# Patient Record
Sex: FEMALE | Race: WHITE | NOT HISPANIC OR LATINO | Employment: UNEMPLOYED | URBAN - METROPOLITAN AREA
[De-identification: names, ages, dates, MRNs, and addresses within clinical notes are randomized per-mention and may not be internally consistent; named-entity substitution may affect disease eponyms.]

---

## 2023-02-01 PROBLEM — D36.9 ADENOMATOUS POLYP: Status: ACTIVE | Noted: 2023-02-01

## 2023-02-01 PROBLEM — R39.9 UTI SYMPTOMS: Status: ACTIVE | Noted: 2023-02-01

## 2023-02-01 PROBLEM — R30.0 DYSURIA: Status: ACTIVE | Noted: 2023-02-01

## 2023-02-01 PROBLEM — M77.12 LEFT LATERAL EPICONDYLITIS: Status: ACTIVE | Noted: 2023-02-01

## 2023-02-01 PROBLEM — N39.0 ACUTE UTI: Status: ACTIVE | Noted: 2023-02-01

## 2023-02-01 PROBLEM — E66.812 CLASS 2 SEVERE OBESITY WITH SERIOUS COMORBIDITY AND BODY MASS INDEX (BMI) OF 39.0 TO 39.9 IN ADULT: Status: ACTIVE | Noted: 2023-02-01

## 2023-02-01 PROBLEM — G43.909 HEADACHE, MIGRAINE: Status: ACTIVE | Noted: 2023-02-01

## 2023-02-01 PROBLEM — E11.9 DIABETES MELLITUS TYPE 2, UNCOMPLICATED (MULTI): Status: ACTIVE | Noted: 2023-02-01

## 2023-02-01 PROBLEM — F41.9 ANXIETY: Status: ACTIVE | Noted: 2023-02-01

## 2023-02-01 PROBLEM — I10 BENIGN ESSENTIAL HYPERTENSION: Status: ACTIVE | Noted: 2023-02-01

## 2023-02-01 PROBLEM — R79.89 ELEVATED LIVER FUNCTION TESTS: Status: ACTIVE | Noted: 2023-02-01

## 2023-02-01 PROBLEM — E66.01 CLASS 2 SEVERE OBESITY WITH SERIOUS COMORBIDITY AND BODY MASS INDEX (BMI) OF 39.0 TO 39.9 IN ADULT (MULTI): Status: ACTIVE | Noted: 2023-02-01

## 2023-02-01 PROBLEM — K21.9 GERD (GASTROESOPHAGEAL REFLUX DISEASE): Status: ACTIVE | Noted: 2023-02-01

## 2023-02-01 PROBLEM — K51.90 ULCERATIVE COLITIS (MULTI): Status: ACTIVE | Noted: 2023-02-01

## 2023-02-01 PROBLEM — M25.50 ARTHRALGIA: Status: ACTIVE | Noted: 2023-02-01

## 2023-02-01 PROBLEM — R07.9 CHEST PAIN: Status: ACTIVE | Noted: 2023-02-01

## 2023-02-01 PROBLEM — E78.5 HYPERLIPIDEMIA: Status: ACTIVE | Noted: 2023-02-01

## 2023-02-01 RX ORDER — BUPROPION HYDROCHLORIDE 150 MG/1
1 TABLET ORAL DAILY
COMMUNITY
Start: 2016-09-28 | End: 2023-03-15 | Stop reason: SDUPTHER

## 2023-02-01 RX ORDER — GABAPENTIN 600 MG/1
600 TABLET, FILM COATED ORAL DAILY
COMMUNITY

## 2023-02-01 RX ORDER — FLUOXETINE 10 MG/1
10 CAPSULE ORAL DAILY
COMMUNITY
End: 2023-03-15 | Stop reason: SDUPTHER

## 2023-02-01 RX ORDER — OMEPRAZOLE 40 MG/1
40 CAPSULE, DELAYED RELEASE ORAL DAILY
COMMUNITY
End: 2024-01-17 | Stop reason: SDUPTHER

## 2023-02-01 RX ORDER — GABAPENTIN 300 MG/1
300 TABLET, FILM COATED ORAL DAILY
COMMUNITY

## 2023-02-01 RX ORDER — ESTRADIOL 0.5 MG/1
1 TABLET ORAL DAILY
COMMUNITY
Start: 2022-09-30 | End: 2023-11-28

## 2023-02-01 RX ORDER — TRIAMTERENE AND HYDROCHLOROTHIAZIDE 37.5; 25 MG/1; MG/1
1 CAPSULE ORAL DAILY
COMMUNITY
Start: 2013-03-08 | End: 2023-03-15 | Stop reason: SDUPTHER

## 2023-02-01 RX ORDER — MESALAMINE 800 MG/1
2 TABLET, DELAYED RELEASE ORAL DAILY
COMMUNITY
Start: 2013-03-11 | End: 2024-01-18 | Stop reason: SDUPTHER

## 2023-02-01 RX ORDER — RIZATRIPTAN BENZOATE 10 MG/1
TABLET ORAL
COMMUNITY
Start: 2021-03-03

## 2023-02-01 RX ORDER — EZETIMIBE 10 MG/1
1 TABLET ORAL NIGHTLY
COMMUNITY
Start: 2015-11-03 | End: 2023-03-15 | Stop reason: SDUPTHER

## 2023-02-01 RX ORDER — SUMATRIPTAN SUCCINATE 100 MG/1
TABLET ORAL
COMMUNITY
Start: 2013-12-31 | End: 2023-10-09 | Stop reason: ALTCHOICE

## 2023-02-01 RX ORDER — ATORVASTATIN CALCIUM 40 MG/1
1 TABLET, FILM COATED ORAL NIGHTLY
COMMUNITY
Start: 2013-07-19 | End: 2023-03-15 | Stop reason: SDUPTHER

## 2023-02-01 RX ORDER — HYDROXYZINE HYDROCHLORIDE 10 MG/1
1-2 TABLET, FILM COATED ORAL 2 TIMES DAILY PRN
COMMUNITY
Start: 2022-03-04 | End: 2024-01-17 | Stop reason: SDUPTHER

## 2023-03-15 ENCOUNTER — OFFICE VISIT (OUTPATIENT)
Dept: PRIMARY CARE | Facility: CLINIC | Age: 48
End: 2023-03-15
Payer: COMMERCIAL

## 2023-03-15 VITALS
TEMPERATURE: 98.2 F | HEART RATE: 82 BPM | BODY MASS INDEX: 38.61 KG/M2 | DIASTOLIC BLOOD PRESSURE: 90 MMHG | OXYGEN SATURATION: 97 % | WEIGHT: 209.8 LBS | SYSTOLIC BLOOD PRESSURE: 138 MMHG | HEIGHT: 62 IN | RESPIRATION RATE: 16 BRPM

## 2023-03-15 DIAGNOSIS — R10.9 FLANK PAIN: Primary | ICD-10-CM

## 2023-03-15 DIAGNOSIS — M85.80 OSTEOPENIA, UNSPECIFIED LOCATION: ICD-10-CM

## 2023-03-15 DIAGNOSIS — Z92.29 HISTORY OF HORMONE REPLACEMENT THERAPY: ICD-10-CM

## 2023-03-15 DIAGNOSIS — Z15.09 BRCA GENE MUTATION POSITIVE: ICD-10-CM

## 2023-03-15 DIAGNOSIS — Z15.01 BRCA GENE MUTATION POSITIVE: ICD-10-CM

## 2023-03-15 DIAGNOSIS — E78.49 OTHER HYPERLIPIDEMIA: ICD-10-CM

## 2023-03-15 DIAGNOSIS — Z90.710 S/P TAH (TOTAL ABDOMINAL HYSTERECTOMY): ICD-10-CM

## 2023-03-15 DIAGNOSIS — Z90.13 H/O BILATERAL MASTECTOMY: ICD-10-CM

## 2023-03-15 DIAGNOSIS — K51.80 OTHER ULCERATIVE COLITIS WITHOUT COMPLICATION (MULTI): ICD-10-CM

## 2023-03-15 DIAGNOSIS — R31.9 HEMATURIA OF UNKNOWN CAUSE: ICD-10-CM

## 2023-03-15 DIAGNOSIS — F41.9 ANXIETY: ICD-10-CM

## 2023-03-15 DIAGNOSIS — E78.2 MIXED HYPERLIPIDEMIA: ICD-10-CM

## 2023-03-15 DIAGNOSIS — I10 HYPERTENSION, UNSPECIFIED TYPE: ICD-10-CM

## 2023-03-15 DIAGNOSIS — E11.9 TYPE 2 DIABETES MELLITUS WITHOUT COMPLICATION, WITHOUT LONG-TERM CURRENT USE OF INSULIN (MULTI): ICD-10-CM

## 2023-03-15 PROCEDURE — 1036F TOBACCO NON-USER: CPT | Performed by: FAMILY MEDICINE

## 2023-03-15 PROCEDURE — 3080F DIAST BP >= 90 MM HG: CPT | Performed by: FAMILY MEDICINE

## 2023-03-15 PROCEDURE — 99214 OFFICE O/P EST MOD 30 MIN: CPT | Performed by: FAMILY MEDICINE

## 2023-03-15 PROCEDURE — 3075F SYST BP GE 130 - 139MM HG: CPT | Performed by: FAMILY MEDICINE

## 2023-03-15 PROCEDURE — 3051F HG A1C>EQUAL 7.0%<8.0%: CPT | Performed by: FAMILY MEDICINE

## 2023-03-15 RX ORDER — FLUOXETINE 10 MG/1
10 CAPSULE ORAL DAILY
Qty: 90 CAPSULE | Refills: 3 | Status: SHIPPED | OUTPATIENT
Start: 2023-03-15 | End: 2024-01-17 | Stop reason: SDUPTHER

## 2023-03-15 RX ORDER — BUPROPION HYDROCHLORIDE 150 MG/1
150 TABLET ORAL DAILY
Qty: 90 TABLET | Refills: 3 | Status: SHIPPED | OUTPATIENT
Start: 2023-03-15 | End: 2023-08-02 | Stop reason: SDUPTHER

## 2023-03-15 RX ORDER — ASCORBIC ACID 500 MG
TABLET ORAL 2 TIMES DAILY
COMMUNITY
Start: 2020-02-05

## 2023-03-15 RX ORDER — RIMEGEPANT SULFATE 75 MG/75MG
75 TABLET, ORALLY DISINTEGRATING ORAL DAILY PRN
COMMUNITY
Start: 2023-02-25 | End: 2023-05-17 | Stop reason: SDUPTHER

## 2023-03-15 RX ORDER — POLYETHYLENE GLYCOL 3350, SODIUM CHLORIDE, SODIUM BICARBONATE, POTASSIUM CHLORIDE 420; 11.2; 5.72; 1.48 G/4L; G/4L; G/4L; G/4L
4000 POWDER, FOR SOLUTION ORAL ONCE
COMMUNITY
Start: 2023-02-09 | End: 2023-03-15 | Stop reason: ALTCHOICE

## 2023-03-15 RX ORDER — ACETAMINOPHEN 325 MG/1
325 TABLET ORAL AS NEEDED
COMMUNITY
Start: 2020-02-05

## 2023-03-15 RX ORDER — NARATRIPTAN 2.5 MG/1
2.5 TABLET ORAL ONCE AS NEEDED
COMMUNITY
Start: 2023-02-09

## 2023-03-15 RX ORDER — EZETIMIBE 10 MG/1
10 TABLET ORAL NIGHTLY
Qty: 90 TABLET | Refills: 3 | Status: SHIPPED | OUTPATIENT
Start: 2023-03-15 | End: 2023-04-14 | Stop reason: SDUPTHER

## 2023-03-15 RX ORDER — FLUCONAZOLE 150 MG/1
TABLET ORAL
COMMUNITY
End: 2023-04-17 | Stop reason: SDUPTHER

## 2023-03-15 RX ORDER — NITROFURANTOIN 25; 75 MG/1; MG/1
100 CAPSULE ORAL EVERY 12 HOURS SCHEDULED
COMMUNITY
Start: 2022-07-25 | End: 2024-03-12 | Stop reason: ALTCHOICE

## 2023-03-15 RX ORDER — DIAZEPAM 5 MG/1
5 TABLET ORAL DAILY PRN
COMMUNITY
Start: 2022-10-28 | End: 2023-03-15 | Stop reason: ALTCHOICE

## 2023-03-15 RX ORDER — TRIAMTERENE AND HYDROCHLOROTHIAZIDE 37.5; 25 MG/1; MG/1
1 CAPSULE ORAL DAILY
Qty: 90 CAPSULE | Refills: 3 | Status: SHIPPED | OUTPATIENT
Start: 2023-03-15 | End: 2024-01-17 | Stop reason: SDUPTHER

## 2023-03-15 RX ORDER — OMEPRAZOLE 20 MG/1
40 TABLET, DELAYED RELEASE ORAL
COMMUNITY
End: 2023-03-15 | Stop reason: ALTCHOICE

## 2023-03-15 RX ORDER — ATORVASTATIN CALCIUM 40 MG/1
40 TABLET, FILM COATED ORAL NIGHTLY
Qty: 90 TABLET | Refills: 3 | Status: SHIPPED | OUTPATIENT
Start: 2023-03-15 | End: 2023-05-30 | Stop reason: SDUPTHER

## 2023-03-15 RX ORDER — DOCUSATE SODIUM 100 MG/1
CAPSULE, LIQUID FILLED ORAL 2 TIMES DAILY
COMMUNITY
Start: 2020-02-05 | End: 2024-01-17 | Stop reason: WASHOUT

## 2023-03-15 ASSESSMENT — PATIENT HEALTH QUESTIONNAIRE - PHQ9
5. POOR APPETITE OR OVEREATING: NOT AT ALL
3. TROUBLE FALLING OR STAYING ASLEEP OR SLEEPING TOO MUCH: MORE THAN HALF THE DAYS
9. THOUGHTS THAT YOU WOULD BE BETTER OFF DEAD, OR OF HURTING YOURSELF: NOT AT ALL
4. FEELING TIRED OR HAVING LITTLE ENERGY: SEVERAL DAYS
SUM OF ALL RESPONSES TO PHQ QUESTIONS 1-9: 3
7. TROUBLE CONCENTRATING ON THINGS, SUCH AS READING THE NEWSPAPER OR WATCHING TELEVISION: NOT AT ALL
10. IF YOU CHECKED OFF ANY PROBLEMS, HOW DIFFICULT HAVE THESE PROBLEMS MADE IT FOR YOU TO DO YOUR WORK, TAKE CARE OF THINGS AT HOME, OR GET ALONG WITH OTHER PEOPLE: SOMEWHAT DIFFICULT
6. FEELING BAD ABOUT YOURSELF - OR THAT YOU ARE A FAILURE OR HAVE LET YOURSELF OR YOUR FAMILY DOWN: NOT AT ALL
1. LITTLE INTEREST OR PLEASURE IN DOING THINGS: NOT AT ALL
SUM OF ALL RESPONSES TO PHQ9 QUESTIONS 1 AND 2: 0
2. FEELING DOWN, DEPRESSED OR HOPELESS: NOT AT ALL
8. MOVING OR SPEAKING SO SLOWLY THAT OTHER PEOPLE COULD HAVE NOTICED. OR THE OPPOSITE, BEING SO FIGETY OR RESTLESS THAT YOU HAVE BEEN MOVING AROUND A LOT MORE THAN USUAL: NOT AT ALL

## 2023-03-15 ASSESSMENT — ANXIETY QUESTIONNAIRES
3. WORRYING TOO MUCH ABOUT DIFFERENT THINGS: SEVERAL DAYS
5. BEING SO RESTLESS THAT IT IS HARD TO SIT STILL: SEVERAL DAYS
7. FEELING AFRAID AS IF SOMETHING AWFUL MIGHT HAPPEN: MORE THAN HALF THE DAYS
1. FEELING NERVOUS, ANXIOUS, OR ON EDGE: SEVERAL DAYS
6. BECOMING EASILY ANNOYED OR IRRITABLE: SEVERAL DAYS
2. NOT BEING ABLE TO STOP OR CONTROL WORRYING: SEVERAL DAYS
GAD7 TOTAL SCORE: 9
4. TROUBLE RELAXING: MORE THAN HALF THE DAYS

## 2023-03-15 NOTE — PROGRESS NOTES
Subjective   Richard Contreras is a 47 y.o. female who presents for New Patient Visit (Pt here to establish care with new PCP ).  HPI  PMH:   BRACA1 prophylatic mastectomy, MOMO-HRT Dr Choudhary  Acute renal failure-hosp in [facility] d/t toradol   B/l hip replacements   DM2   [facility]-Dr Donald Linares COVID migraines-[facility]   NICOLETTE-prozac added to wellbutrin 2022   HTN   HLD  Osteopenia 11/2022 T 1.7 spine  Pap NIL neg HPV 5/2021  3 kids 1 [facility], 2 [facility]. Very involved     Episode of urinating blood and flank pain, started 1 mo ago after sex. Vaginitis swab UA and Cx neg w GYN.     Tends to yet UTI/yeast inf in the summer bc swims a lot     Upcoming c-scope next wk for UC and abd pain     Jardiance inc from 10 to 25 in Jan, hasnt started higher dose yet     Mood much better since adding prozac recently     Needs meds transitioned to me   Current Outpatient Medications on File Prior to Visit   Medication Sig Dispense Refill    acetaminophen (Tylenol) 325 mg tablet Take 1 tablet (325 mg) by mouth if needed.      ascorbic acid (Vitamin C) 500 mg tablet Take by mouth twice a day.      docusate sodium (Colace) 100 mg capsule Take by mouth twice a day.      empagliflozin (Jardiance) 25 mg Take 1 tablet (25 mg) by mouth once daily.      estradiol (Estrace) 0.5 mg tablet Take 1 tablet (0.5 mg) by mouth once daily.      fluconazole (Diflucan) 150 mg tablet take 1 tablet by mouth every 72 hours for 3 doses      gabapentin (Gralise) 300 mg tablet extended release 24 hr Take 1 tablet (300 mg) by mouth once daily.      gabapentin (Gralise) 600 mg tablet extended release 24 hr Take 600 mg by mouth 1 (one) time each day.      hydrOXYzine HCL (Atarax) 10 mg tablet Take 1-2 tablets (10-20 mg) by mouth 2 times a day as needed for anxiety.      mesalamine (Asacol) 800 mg EC tablet Take 2 tablets (1,600 mg) by mouth once daily. LAST REFILL UNTIL SEEN IN OFFICE      naratriptan (Amerge) 2.5 mg tablet Take 1 tablet (2.5 mg) by mouth 1 time if  "needed.      nitrofurantoin, macrocrystal-monohydrate, (Macrobid) 100 mg capsule Take 1 capsule (100 mg) by mouth in the morning and 1 capsule (100 mg) before bedtime.      NON FORMULARY MEDICAL MARIJUANA (NOT [facility] PRESCRIBED) DO NOT FILL      Nurtec ODT 75 mg tablet,disintegrating Take 1 tablet (75 mg) by mouth once daily as needed.      omeprazole (PriLOSEC) 40 mg DR capsule Take 1 capsule (40 mg) by mouth once daily. Do not crush or chew.      rizatriptan (Maxalt) 10 mg tablet Take by mouth. TAKE 1 TABLET BY MOUTH AS NEEDED FOR MIGRAINE HEADACHE (SEE ADMINISTRATION INSTRUCTIONS). MAY REPEAT IN 2 HOURS IF NEEDED.      SUMAtriptan (Imitrex) 100 mg tablet Take by mouth. TAKE 1 TABLET AT ONSET OF MIGRAINE, MAY REPEAT AFTER 2 HOURS IF HEADACHE RETURNS, MAXIMUM 2 PER DAY      [DISCONTINUED] atorvastatin (Lipitor) 40 mg tablet Take 1 tablet (40 mg) by mouth once daily at bedtime.      [DISCONTINUED] buPROPion XL (Wellbutrin XL) 150 mg 24 hr tablet Take 1 tablet (150 mg) by mouth once daily.      [DISCONTINUED] diazePAM (Valium) 5 mg tablet Take 1 tablet (5 mg) by mouth once daily as needed.      [DISCONTINUED] ezetimibe (Zetia) 10 mg tablet Take 1 tablet (10 mg) by mouth once daily at bedtime.      [DISCONTINUED] FLUoxetine (PROzac) 10 mg capsule Take 1 capsule (10 mg) by mouth once daily.      [DISCONTINUED] polyethylene glycol-electrolytes 420 gram solution Take 4,000 mL by mouth 1 time.      [DISCONTINUED] triamterene-hydrochlorothiazid (Dyazide) 37.5-25 mg capsule Take 1 capsule by mouth once daily.      [DISCONTINUED] omeprazole OTC (PriLOSEC OTC) 20 mg EC tablet Take 2 tablets (40 mg) by mouth once daily in the morning. Take before meals.       No current facility-administered medications on file prior to visit.       Objective   /90   Pulse 82   Temp 36.8 °C (98.2 °F)   Resp 16   Ht 1.567 m (5' 1.69\")   Wt 95.2 kg (209 lb 12.8 oz)   SpO2 97%   BMI 38.76 kg/m²    Physical Exam  General: NAD  HEENT:NCAT, " PERRLA, nml OP  Neck: no cervical CAITLYN  Heart: RRR no murmur, no edema   Lungs: CTA b/l, no wheeze or rhonchi   GI: abd soft, nontender, nondistended. No reproducible flank pain   MSK: no c/c/e. nml gait   Skin: warm and dry  Psych: cooperative, appropriate affect  Neuro: speech clear. A&Ox3  Assessment/Plan   Problem List Items Addressed This Visit          Digestive    Ulcerative colitis (CMS/[facility]): per GI upcoming c-scope next wk        Endocrine/Metabolic    Diabetes mellitus type 2, uncomplicated (CMS/HCC): agree w inc jardiance from 10 to 25 mg. Recheck a1c in 3 mo   Check microalbumin today, not on ace/arb. Consider adding if elevated     Relevant Orders    Albumin, urine, random    Hemoglobin A1C    Albumin, urine, random       Other    Anxiety: improved w adding prozac to wellbutrin. RF meds     Relevant Medications    buPROPion XL (Wellbutrin XL) 150 mg 24 hr tablet    FLUoxetine (PROzac) 10 mg capsule    Hyperlipidemia: check lipids in 3 mo, RF meds     Relevant Medications    atorvastatin (Lipitor) 40 mg tablet    ezetimibe (Zetia) 10 mg tablet    Other Relevant Orders    Comprehensive Metabolic Panel    Lipid Panel     Other Visit Diagnoses       Flank pain: in the setting of hematuria concern for kidney stone. Rpt UA w micro and culture, CT abd/pelvis.     -  Primary:     Relevant Orders    CT abdomen pelvis w IV contrast    Urine Culture    Urinalysis with Reflex Microscopic    BRCA gene mutation positive        S/P MOMO (total abdominal hysterectomy)        H/O bilateral mastectomy        Osteopenia, unspecified location: per gyn, last DEXA 11/2022         History of hormone replacement therapy per gyn         Hematuria of unknown cause  see above       Relevant Orders    CT abdomen pelvis w IV contrast    Urine Culture    Urinalysis with Reflex Microscopic    Hypertension, unspecified type: borderline, RF dyazide. Consider ace/arb if albumin elevated.         Relevant Medications     triamterene-hydrochlorothiazid (Dyazide) 37.5-25 mg capsule

## 2023-04-05 DIAGNOSIS — R10.9 FLANK PAIN: ICD-10-CM

## 2023-04-13 ENCOUNTER — LAB (OUTPATIENT)
Dept: LAB | Facility: LAB | Age: 48
End: 2023-04-13
Payer: COMMERCIAL

## 2023-04-13 DIAGNOSIS — E11.9 TYPE 2 DIABETES MELLITUS WITHOUT COMPLICATION, WITHOUT LONG-TERM CURRENT USE OF INSULIN (MULTI): ICD-10-CM

## 2023-04-13 DIAGNOSIS — R31.9 HEMATURIA OF UNKNOWN CAUSE: ICD-10-CM

## 2023-04-13 DIAGNOSIS — R10.9 FLANK PAIN: ICD-10-CM

## 2023-04-13 LAB
ALBUMIN (MG/L) IN URINE: 47.8 MG/L
ALBUMIN/CREATININE (UG/MG) IN URINE: 53.2 UG/MG CRT (ref 0–30)
APPEARANCE, URINE: ABNORMAL
BACTERIA, URINE: ABNORMAL /HPF
BILIRUBIN, URINE: NEGATIVE
BLOOD, URINE: ABNORMAL
BUDDING YEAST, URINE: PRESENT /HPF
COLOR, URINE: YELLOW
CREATININE (MG/DL) IN URINE: 89.9 MG/DL (ref 20–320)
GLUCOSE, URINE: ABNORMAL MG/DL
KETONES, URINE: NEGATIVE MG/DL
LEUKOCYTE ESTERASE, URINE: NEGATIVE
NITRITE, URINE: NEGATIVE
PH, URINE: 6 (ref 5–8)
PROTEIN, URINE: NEGATIVE MG/DL
RBC, URINE: 4 /HPF (ref 0–5)
SPECIFIC GRAVITY, URINE: 1.03 (ref 1–1.03)
SQUAMOUS EPITHELIAL CELLS, URINE: 10 /HPF
UROBILINOGEN, URINE: <2 MG/DL (ref 0–1.9)
WBC, URINE: 4 /HPF (ref 0–5)

## 2023-04-13 PROCEDURE — 82570 ASSAY OF URINE CREATININE: CPT

## 2023-04-13 PROCEDURE — 82043 UR ALBUMIN QUANTITATIVE: CPT

## 2023-04-13 PROCEDURE — 81001 URINALYSIS AUTO W/SCOPE: CPT

## 2023-04-14 ENCOUNTER — PATIENT MESSAGE (OUTPATIENT)
Dept: PRIMARY CARE | Facility: CLINIC | Age: 48
End: 2023-04-14
Payer: COMMERCIAL

## 2023-04-14 DIAGNOSIS — E78.49 OTHER HYPERLIPIDEMIA: ICD-10-CM

## 2023-04-14 RX ORDER — EZETIMIBE 10 MG/1
10 TABLET ORAL NIGHTLY
Qty: 90 TABLET | Refills: 3 | Status: SHIPPED | OUTPATIENT
Start: 2023-04-14 | End: 2023-04-17 | Stop reason: SDUPTHER

## 2023-04-17 DIAGNOSIS — B37.31 YEAST VAGINITIS: Primary | ICD-10-CM

## 2023-04-17 DIAGNOSIS — E78.49 OTHER HYPERLIPIDEMIA: ICD-10-CM

## 2023-04-17 RX ORDER — EZETIMIBE 10 MG/1
10 TABLET ORAL NIGHTLY
Qty: 90 TABLET | Refills: 3 | Status: SHIPPED | OUTPATIENT
Start: 2023-04-17 | End: 2024-01-17 | Stop reason: SDUPTHER

## 2023-04-17 RX ORDER — FLUCONAZOLE 150 MG/1
TABLET ORAL
Qty: 1 TABLET | Refills: 0 | Status: SHIPPED | OUTPATIENT
Start: 2023-04-17 | End: 2023-04-19 | Stop reason: SDUPTHER

## 2023-04-19 DIAGNOSIS — B37.31 YEAST VAGINITIS: ICD-10-CM

## 2023-04-19 RX ORDER — FLUCONAZOLE 150 MG/1
150 TABLET ORAL ONCE
Qty: 1 TABLET | Refills: 0 | Status: SHIPPED | OUTPATIENT
Start: 2023-04-19 | End: 2023-04-19

## 2023-05-17 DIAGNOSIS — G43.709 CHRONIC MIGRAINE WITHOUT AURA WITHOUT STATUS MIGRAINOSUS, NOT INTRACTABLE: ICD-10-CM

## 2023-05-17 DIAGNOSIS — E11.9 TYPE 2 DIABETES MELLITUS WITHOUT COMPLICATION, WITHOUT LONG-TERM CURRENT USE OF INSULIN (MULTI): Primary | ICD-10-CM

## 2023-05-17 NOTE — TELEPHONE ENCOUNTER
Patient sent a "[employer] message letting you know she is using more than 8 nurtec needing a refill sooner, also asked for refill of Jardiance will be out tomorrow, Advise?

## 2023-05-30 ENCOUNTER — TELEPHONE (OUTPATIENT)
Dept: PRIMARY CARE | Facility: CLINIC | Age: 48
End: 2023-05-30
Payer: COMMERCIAL

## 2023-05-30 DIAGNOSIS — E78.49 OTHER HYPERLIPIDEMIA: ICD-10-CM

## 2023-05-30 RX ORDER — ATORVASTATIN CALCIUM 40 MG/1
40 TABLET, FILM COATED ORAL NIGHTLY
Qty: 30 TABLET | Refills: 11 | Status: SHIPPED | OUTPATIENT
Start: 2023-05-30 | End: 2024-01-17 | Stop reason: SDUPTHER

## 2023-05-30 RX ORDER — ATORVASTATIN CALCIUM 40 MG/1
40 TABLET, FILM COATED ORAL
COMMUNITY
Start: 2020-07-02 | End: 2023-10-09 | Stop reason: ALTCHOICE

## 2023-08-02 ENCOUNTER — PATIENT MESSAGE (OUTPATIENT)
Dept: PRIMARY CARE | Facility: CLINIC | Age: 48
End: 2023-08-02
Payer: COMMERCIAL

## 2023-08-02 DIAGNOSIS — F41.9 ANXIETY: ICD-10-CM

## 2023-08-02 RX ORDER — BUPROPION HYDROCHLORIDE 150 MG/1
150 TABLET ORAL DAILY
Qty: 90 TABLET | Refills: 3 | Status: SHIPPED | OUTPATIENT
Start: 2023-08-02 | End: 2024-01-17 | Stop reason: SDUPTHER

## 2023-08-02 NOTE — TELEPHONE ENCOUNTER
From: Richard Contreras  To: Brittany Behm, DO  Sent: 8/2/2023 7:51 AM EDT  Subject: Refill     Can you check my refills please? I need the Wellbutrin for sure. Thanks

## 2023-08-02 NOTE — TELEPHONE ENCOUNTER
----- Message from Richard Contreras sent at 8/2/2023  7:51 AM EDT -----  Regarding: Refill   Contact: [phone]  Can you check my refills please? I need the Wellbutrin for sure. Thanks

## 2023-10-03 ENCOUNTER — TELEPHONE (OUTPATIENT)
Dept: OBSTETRICS AND GYNECOLOGY | Facility: CLINIC | Age: 48
End: 2023-10-03
Payer: COMMERCIAL

## 2023-10-03 NOTE — TELEPHONE ENCOUNTER
PT HAS SWOLLEN LYMPH NODES AN IN THE PAST HAS HAD SKIN CANCER ISSUES AND A MASTECTOMY. THE SOONEST APPT. WE COULD GET HER WAS OCT.20  SHE WOULD LIKE TO GET IN SOONER. NEED YOUR PERMISSION TO OPEN A SLOT.

## 2023-10-09 ENCOUNTER — OFFICE VISIT (OUTPATIENT)
Dept: OBSTETRICS AND GYNECOLOGY | Facility: CLINIC | Age: 48
End: 2023-10-09
Payer: COMMERCIAL

## 2023-10-09 VITALS
HEIGHT: 61 IN | BODY MASS INDEX: 38.71 KG/M2 | SYSTOLIC BLOOD PRESSURE: 120 MMHG | DIASTOLIC BLOOD PRESSURE: 74 MMHG | WEIGHT: 205 LBS

## 2023-10-09 DIAGNOSIS — M79.89 LEFT AXILLARY SWELLING: Primary | ICD-10-CM

## 2023-10-09 PROCEDURE — 3078F DIAST BP <80 MM HG: CPT | Performed by: OBSTETRICS & GYNECOLOGY

## 2023-10-09 PROCEDURE — 3074F SYST BP LT 130 MM HG: CPT | Performed by: OBSTETRICS & GYNECOLOGY

## 2023-10-09 PROCEDURE — 1036F TOBACCO NON-USER: CPT | Performed by: OBSTETRICS & GYNECOLOGY

## 2023-10-09 PROCEDURE — 3051F HG A1C>EQUAL 7.0%<8.0%: CPT | Performed by: OBSTETRICS & GYNECOLOGY

## 2023-10-09 PROCEDURE — 99213 OFFICE O/P EST LOW 20 MIN: CPT | Performed by: OBSTETRICS & GYNECOLOGY

## 2023-10-09 NOTE — PROGRESS NOTES
Subjective   Richard Contreras is a 48 y.o. female who complains of swollen lymph nodes.     HPI:  Patient is a known BRCA 1 carrier. She had a prophylactic mastectomy and oophorectomy in June 2007. Breast reconstruction after.   3-4 weeks ago there was a red spot, then after became swollen and a lump.   Last Tuesday got worse, more painful, and saw dermatology, saw red spot and lump but nothing else and told lymph nodes were inflamed.  Got better this weekend, less swollen and pinching feeling. She can still feel a small lump on the side of her left breast.  No right sided issues.  Called plastic surgeon too and has appointment on Wednesday.     Objective   Physical Exam:   Gen: no acute distress  Breasts: scars noted from prior mastectomy bilaterally  No lump palpable in either the breast or axilla bilaterally. No redness seen. Slight increased size appearance of left axilla compared to right, but no other changes appreciated on exam    Assessment/Plan   1. Left axillary swelling  Overall normal exam findings today. Possible she had an acute issues such as a small cyst or boil in the breast that caused temporary inflammation of the lymph nodes.  Given her history, I will order an ultrasound to assess the breast and axilla.  She is also still going to follow up with her plastic surgeon later this week.  - BI US breast complete left; Future

## 2023-10-10 ENCOUNTER — ANCILLARY PROCEDURE (OUTPATIENT)
Dept: RADIOLOGY | Facility: CLINIC | Age: 48
End: 2023-10-10
Payer: COMMERCIAL

## 2023-10-10 DIAGNOSIS — M79.89 LEFT AXILLARY SWELLING: ICD-10-CM

## 2023-10-10 PROCEDURE — 76642 ULTRASOUND BREAST LIMITED: CPT | Mod: LT

## 2023-10-10 PROCEDURE — 76642 ULTRASOUND BREAST LIMITED: CPT | Mod: LEFT SIDE | Performed by: STUDENT IN AN ORGANIZED HEALTH CARE EDUCATION/TRAINING PROGRAM

## 2023-10-10 PROCEDURE — 76982 USE 1ST TARGET LESION: CPT | Mod: LEFT SIDE | Performed by: STUDENT IN AN ORGANIZED HEALTH CARE EDUCATION/TRAINING PROGRAM

## 2023-10-20 ENCOUNTER — APPOINTMENT (OUTPATIENT)
Dept: OBSTETRICS AND GYNECOLOGY | Facility: CLINIC | Age: 48
End: 2023-10-20
Payer: COMMERCIAL

## 2023-11-02 ENCOUNTER — TELEPHONE (OUTPATIENT)
Dept: OBSTETRICS AND GYNECOLOGY | Facility: CLINIC | Age: 48
End: 2023-11-02
Payer: COMMERCIAL

## 2023-11-02 DIAGNOSIS — T85.43XA RUPTURE OF IMPLANT OF LEFT BREAST, INITIAL ENCOUNTER: Primary | ICD-10-CM

## 2023-11-02 NOTE — TELEPHONE ENCOUNTER
Patient called. She needs a bilateral MRI without constrast, STAT to evaluate for implant rupture.

## 2023-11-03 ENCOUNTER — HOSPITAL ENCOUNTER (OUTPATIENT)
Dept: RADIOLOGY | Facility: HOSPITAL | Age: 48
Discharge: HOME | End: 2023-11-03
Payer: COMMERCIAL

## 2023-11-03 DIAGNOSIS — T85.43XA RUPTURE OF IMPLANT OF LEFT BREAST, INITIAL ENCOUNTER: ICD-10-CM

## 2023-11-03 PROCEDURE — 77047 MRI BREAST C- BILATERAL: CPT | Mod: 50

## 2023-11-03 PROCEDURE — 77047 MRI BREAST C- BILATERAL: CPT | Mod: BILATERAL PROCEDURE | Performed by: STUDENT IN AN ORGANIZED HEALTH CARE EDUCATION/TRAINING PROGRAM

## 2023-11-27 DIAGNOSIS — E28.319 PREMATURE MENOPAUSE ON HORMONE REPLACEMENT THERAPY: Primary | ICD-10-CM

## 2023-11-27 DIAGNOSIS — Z79.890 PREMATURE MENOPAUSE ON HORMONE REPLACEMENT THERAPY: Primary | ICD-10-CM

## 2023-11-28 RX ORDER — ESTRADIOL 0.5 MG/1
0.5 TABLET ORAL DAILY
Qty: 90 TABLET | Refills: 0 | Status: SHIPPED | OUTPATIENT
Start: 2023-11-28 | End: 2024-01-17 | Stop reason: SDUPTHER

## 2023-12-29 ENCOUNTER — HOSPITAL ENCOUNTER (OUTPATIENT)
Dept: RADIOLOGY | Facility: HOSPITAL | Age: 48
Discharge: HOME | End: 2023-12-29
Payer: COMMERCIAL

## 2023-12-29 ENCOUNTER — OFFICE VISIT (OUTPATIENT)
Dept: ORTHOPEDIC SURGERY | Facility: HOSPITAL | Age: 48
End: 2023-12-29
Payer: COMMERCIAL

## 2023-12-29 VITALS — WEIGHT: 200 LBS | HEIGHT: 61 IN | BODY MASS INDEX: 37.76 KG/M2

## 2023-12-29 DIAGNOSIS — M79.671 RIGHT FOOT PAIN: ICD-10-CM

## 2023-12-29 PROCEDURE — 1036F TOBACCO NON-USER: CPT | Performed by: SPECIALIST/TECHNOLOGIST

## 2023-12-29 PROCEDURE — 3051F HG A1C>EQUAL 7.0%<8.0%: CPT | Performed by: SPECIALIST/TECHNOLOGIST

## 2023-12-29 PROCEDURE — 73630 X-RAY EXAM OF FOOT: CPT | Mod: RT

## 2023-12-29 PROCEDURE — 73630 X-RAY EXAM OF FOOT: CPT | Mod: RIGHT SIDE | Performed by: RADIOLOGY

## 2023-12-29 PROCEDURE — 99213 OFFICE O/P EST LOW 20 MIN: CPT | Performed by: SPECIALIST/TECHNOLOGIST

## 2023-12-29 ASSESSMENT — PAIN - FUNCTIONAL ASSESSMENT: PAIN_FUNCTIONAL_ASSESSMENT: 0-10

## 2023-12-29 ASSESSMENT — PAIN DESCRIPTION - DESCRIPTORS: DESCRIPTORS: BURNING

## 2023-12-29 ASSESSMENT — PAIN SCALES - GENERAL: PAINLEVEL_OUTOF10: 4

## 2023-12-29 NOTE — PROGRESS NOTES
Subjective    Patient ID: Richard Contreras is a 48 y.o. female.    Chief Complaint: Pain of the Right Foot (heel)      HPI:  Richard Contreras is a 48 y.o. female presenting to the orthopedic walk in clinic with right heel and foot pain occurring on 12/25/23 when she slipped and hit her heel on her coffee table.  She is currently recovering from a mastectomy  revision surgery and was concerned about not hitting her chest while she fell.  Today, she reports a burning ache over the poster, plantar and lateral heel that worsens with unsupported standing and walking.  She said she has some increased pain while walking and standing during the [employer] game last night but was able to complete all walks without interruption. She reports improvement of her pain when wearing shoes.  She has taken Tylenol as needed for the pain.  She denies prior injury.  She denies numbness or tingling    Objective     Review of Systems:  General: Negative for wasting, developmental abnormalities  Skin: Positive for bruise, Negative for masses, lesions   Head: Negative for fracture, trauma, asymmetry   Eyes: Negative for visual changes, redness, lesions   ENT: Negative for obstruction, erythema, hearing disturbances, bleeding  Respiratory: Negative for cough, wheezing, snores  Cardiovascular: Negative for palpitations, pressure  Gastrointestinal: Negative for nausea, vomiting, diarrhea  Genitourinary: Negative for erythema, discharge, odor, lesions, masses  Musculoskeletal: Negative for pain, tenderness, edema   Neuro/Psych: Negative for altered mental status, diminished affect     Physical Exam:  General appearance: WN, WD female, in no acute distress  Skin: No rashes, lesions or wounds  Head: Normocephalic, no evidence of trauma  Eye: EOMI, conjunctiva clear, no discharge  ENT: MMM, nares patent  Neck: No abnormal contour, tracheal midline  Chest/lungs: No respiratory distress, speaking in complete sentences  Musculoskeletal: TTP  posterior, lateral calcaneus, plantar heel, proximal plantar fascia and medial arch, 5/5 MMT DF/PF/INV/EV, negative squeeze test, negative bump test, negative Moran test No decreased ROM, muscle wasting, rigidity    Neurological: A&O x3, no focal deficits, intact bilateral UE/LE  Psych: normal affect, mood, appearance      Image Results:  X-rays taken in the office, 12/29/23, were reviewed with the patient and show no acute fractures or dislocations. Small calcaneal spur at Achilles insertion      Assessment/Plan   Encounter Diagnoses:  Right foot pain    Orders Placed This Encounter    XR foot right 3+ views       The patient and I discussed her clinical presentation and physical exam findings consistent with right foot heel contusion.  We agreed to treat this conservatively.  She was advised to obtain and place heel cups in both shoes to allow for additional cushioning.  She should wear properly fitting footwear, even at home.  She will obtain over the counter diclofenac gel to apply to the heel four times daily.  She may ice for 10 -15 minutes three times daily.  She is in agreement with this plan.  All of her questions were answered

## 2024-01-10 DIAGNOSIS — E11.9 TYPE 2 DIABETES MELLITUS WITHOUT COMPLICATION, WITHOUT LONG-TERM CURRENT USE OF INSULIN (MULTI): ICD-10-CM

## 2024-01-11 DIAGNOSIS — G43.709 CHRONIC MIGRAINE WITHOUT AURA WITHOUT STATUS MIGRAINOSUS, NOT INTRACTABLE: ICD-10-CM

## 2024-01-11 RX ORDER — RIMEGEPANT SULFATE 75 MG/75MG
TABLET, ORALLY DISINTEGRATING ORAL
Qty: 8 TABLET | Refills: 3 | Status: SHIPPED | OUTPATIENT
Start: 2024-01-11

## 2024-01-17 ENCOUNTER — OFFICE VISIT (OUTPATIENT)
Dept: PRIMARY CARE | Facility: CLINIC | Age: 49
End: 2024-01-17
Payer: COMMERCIAL

## 2024-01-17 VITALS
TEMPERATURE: 97.7 F | DIASTOLIC BLOOD PRESSURE: 81 MMHG | OXYGEN SATURATION: 94 % | HEIGHT: 61 IN | WEIGHT: 201 LBS | RESPIRATION RATE: 16 BRPM | HEART RATE: 91 BPM | SYSTOLIC BLOOD PRESSURE: 120 MMHG | BODY MASS INDEX: 37.95 KG/M2

## 2024-01-17 DIAGNOSIS — Z79.890 PREMATURE MENOPAUSE ON HORMONE REPLACEMENT THERAPY: ICD-10-CM

## 2024-01-17 DIAGNOSIS — K51.80 OTHER ULCERATIVE COLITIS WITHOUT COMPLICATION (MULTI): ICD-10-CM

## 2024-01-17 DIAGNOSIS — B96.89 BACTERIAL SINUSITIS: ICD-10-CM

## 2024-01-17 DIAGNOSIS — K21.9 GASTROESOPHAGEAL REFLUX DISEASE, UNSPECIFIED WHETHER ESOPHAGITIS PRESENT: ICD-10-CM

## 2024-01-17 DIAGNOSIS — J32.9 BACTERIAL SINUSITIS: ICD-10-CM

## 2024-01-17 DIAGNOSIS — M79.604 PAIN OF RIGHT LOWER EXTREMITY: ICD-10-CM

## 2024-01-17 DIAGNOSIS — F41.9 ANXIETY: ICD-10-CM

## 2024-01-17 DIAGNOSIS — I10 HYPERTENSION, UNSPECIFIED TYPE: ICD-10-CM

## 2024-01-17 DIAGNOSIS — E78.49 OTHER HYPERLIPIDEMIA: ICD-10-CM

## 2024-01-17 DIAGNOSIS — E28.319 PREMATURE MENOPAUSE ON HORMONE REPLACEMENT THERAPY: ICD-10-CM

## 2024-01-17 DIAGNOSIS — M85.80 OSTEOPENIA, UNSPECIFIED LOCATION: Primary | ICD-10-CM

## 2024-01-17 DIAGNOSIS — E11.9 TYPE 2 DIABETES MELLITUS WITHOUT COMPLICATION, WITHOUT LONG-TERM CURRENT USE OF INSULIN (MULTI): ICD-10-CM

## 2024-01-17 PROCEDURE — 3074F SYST BP LT 130 MM HG: CPT | Performed by: FAMILY MEDICINE

## 2024-01-17 PROCEDURE — 1036F TOBACCO NON-USER: CPT | Performed by: FAMILY MEDICINE

## 2024-01-17 PROCEDURE — 3079F DIAST BP 80-89 MM HG: CPT | Performed by: FAMILY MEDICINE

## 2024-01-17 PROCEDURE — 99214 OFFICE O/P EST MOD 30 MIN: CPT | Performed by: FAMILY MEDICINE

## 2024-01-17 RX ORDER — ESTRADIOL 0.5 MG/1
0.5 TABLET ORAL DAILY
Qty: 90 TABLET | Refills: 3 | Status: SHIPPED | OUTPATIENT
Start: 2024-01-17

## 2024-01-17 RX ORDER — FLUOXETINE 10 MG/1
10 CAPSULE ORAL DAILY
Qty: 90 CAPSULE | Refills: 3 | Status: SHIPPED | OUTPATIENT
Start: 2024-01-17 | End: 2025-01-16

## 2024-01-17 RX ORDER — EZETIMIBE 10 MG/1
10 TABLET ORAL NIGHTLY
Qty: 90 TABLET | Refills: 3 | Status: SHIPPED | OUTPATIENT
Start: 2024-01-17 | End: 2025-01-16

## 2024-01-17 RX ORDER — ATORVASTATIN CALCIUM 40 MG/1
40 TABLET, FILM COATED ORAL NIGHTLY
Qty: 90 TABLET | Refills: 3 | Status: SHIPPED | OUTPATIENT
Start: 2024-01-17 | End: 2025-01-16

## 2024-01-17 RX ORDER — BENZONATATE 100 MG/1
100 CAPSULE ORAL 3 TIMES DAILY PRN
COMMUNITY
Start: 2024-01-04

## 2024-01-17 RX ORDER — AMOXICILLIN AND CLAVULANATE POTASSIUM 875; 125 MG/1; MG/1
875 TABLET, FILM COATED ORAL 2 TIMES DAILY
Qty: 14 TABLET | Refills: 0 | Status: SHIPPED | OUTPATIENT
Start: 2024-01-17 | End: 2024-01-24

## 2024-01-17 RX ORDER — OMEPRAZOLE 40 MG/1
40 CAPSULE, DELAYED RELEASE ORAL
Qty: 90 CAPSULE | Refills: 3 | Status: SHIPPED | OUTPATIENT
Start: 2024-01-17

## 2024-01-17 RX ORDER — BUPROPION HYDROCHLORIDE 150 MG/1
150 TABLET ORAL DAILY
Qty: 90 TABLET | Refills: 3 | Status: SHIPPED | OUTPATIENT
Start: 2024-01-17 | End: 2025-01-16

## 2024-01-17 RX ORDER — ONDANSETRON 4 MG/1
4 TABLET, FILM COATED ORAL EVERY 6 HOURS PRN
COMMUNITY
Start: 2023-11-24

## 2024-01-17 RX ORDER — FLUCONAZOLE 150 MG/1
150 TABLET ORAL ONCE
Qty: 1 TABLET | Refills: 0 | Status: SHIPPED | OUTPATIENT
Start: 2024-01-17 | End: 2024-01-17

## 2024-01-17 RX ORDER — HYDROXYZINE HYDROCHLORIDE 10 MG/1
10-20 TABLET, FILM COATED ORAL 2 TIMES DAILY PRN
Qty: 180 TABLET | Refills: 1 | Status: SHIPPED | OUTPATIENT
Start: 2024-01-17

## 2024-01-17 RX ORDER — TRIAMTERENE AND HYDROCHLOROTHIAZIDE 37.5; 25 MG/1; MG/1
1 CAPSULE ORAL DAILY
Qty: 90 CAPSULE | Refills: 3 | Status: SHIPPED | OUTPATIENT
Start: 2024-01-17 | End: 2025-01-16

## 2024-01-17 ASSESSMENT — ENCOUNTER SYMPTOMS
OCCASIONAL FEELINGS OF UNSTEADINESS: 0
LOSS OF SENSATION IN FEET: 0
DEPRESSION: 0

## 2024-01-17 NOTE — PROGRESS NOTES
Subjective   Richard Contreras is a 48 y.o. female who presents for Annual Exam, Med Refill, and Cough (15 days of cough, congestions, post nasal drip).  HPI    Congestion and cough for 15 d. Went to min clinic. Less fatigue. Taking tessalon.     Fell 12/25, pain on R foot pain and burning, radiates into achilles.     Reconstructive surg for implant rupture in dec, just getting over recovery.     Needs med RF   Current Outpatient Medications on File Prior to Visit   Medication Sig Dispense Refill    benzonatate (Tessalon) 100 mg capsule Take 1 capsule (100 mg) by mouth 3 times a day as needed.      ondansetron (Zofran) 4 mg tablet Take 1 tablet (4 mg) by mouth every 6 hours if needed for nausea.      acetaminophen (Tylenol) 325 mg tablet Take 1 tablet (325 mg) by mouth if needed.      ascorbic acid (Vitamin C) 500 mg tablet Take by mouth twice a day.      gabapentin (Gralise) 300 mg tablet extended release 24 hr Take 1 tablet (300 mg) by mouth once daily.      gabapentin (Gralise) 600 mg tablet extended release 24 hr Take 600 mg by mouth 1 (one) time each day.      mesalamine (Asacol) 800 mg EC tablet Take 2 tablets (1,600 mg) by mouth once daily. LAST REFILL UNTIL SEEN IN OFFICE      naratriptan (Amerge) 2.5 mg tablet Take 1 tablet (2.5 mg) by mouth 1 time if needed.      nitrofurantoin, macrocrystal-monohydrate, (Macrobid) 100 mg capsule Take 1 capsule (100 mg) by mouth every 12 hours.      Nurtec ODT 75 mg tablet,disintegrating DISSOLVE ONE TABLET IN MOUTH once DAILY as needed for migraines. 8 tablet 3    rizatriptan (Maxalt) 10 mg tablet Take by mouth. TAKE 1 TABLET BY MOUTH AS NEEDED FOR MIGRAINE HEADACHE (SEE ADMINISTRATION INSTRUCTIONS). MAY REPEAT IN 2 HOURS IF NEEDED.      [DISCONTINUED] atorvastatin (Lipitor) 40 mg tablet Take 1 tablet (40 mg) by mouth once daily at bedtime. 30 tablet 11    [DISCONTINUED] buPROPion XL (Wellbutrin XL) 150 mg 24 hr tablet Take 1 tablet (150 mg) by mouth once daily. 90  "tablet 3    [DISCONTINUED] docusate sodium (Colace) 100 mg capsule Take by mouth twice a day.      [DISCONTINUED] empagliflozin (Jardiance) 25 mg Take 1 tablet (25 mg) by mouth once daily. 30 tablet 5    [DISCONTINUED] estradiol (Estrace) 0.5 mg tablet TAKE ONE TABLET BY MOUTH DAILY 90 tablet 0    [DISCONTINUED] ezetimibe (Zetia) 10 mg tablet Take 1 tablet (10 mg) by mouth once daily at bedtime. 90 tablet 3    [DISCONTINUED] FLUoxetine (PROzac) 10 mg capsule Take 1 capsule (10 mg) by mouth once daily. 90 capsule 3    [DISCONTINUED] hydrOXYzine HCL (Atarax) 10 mg tablet Take 1-2 tablets (10-20 mg) by mouth 2 times a day as needed for anxiety.      [DISCONTINUED] NON FORMULARY MEDICAL MARIJUANA (NOT [facility] PRESCRIBED) DO NOT FILL      [DISCONTINUED] omeprazole (PriLOSEC) 40 mg DR capsule Take 1 capsule (40 mg) by mouth once daily. Do not crush or chew.      [DISCONTINUED] rimegepant (Nurtec ODT) 75 mg tablet,disintegrating Take 1 tablet (75 mg) by mouth once daily as needed (as needed for migraines). 8 tablet 5    [DISCONTINUED] triamterene-hydrochlorothiazid (Dyazide) 37.5-25 mg capsule Take 1 capsule by mouth once daily. 90 capsule 3     No current facility-administered medications on file prior to visit.                  Objective   /81   Pulse 91   Temp 36.5 °C (97.7 °F)   Resp 16   Ht 1.549 m (5' 1\")   Wt 91.2 kg (201 lb)   SpO2 94%   BMI 37.98 kg/m²    Physical Exam  General: NAD  HEENT:NCAT, PERRLA, nml OP, NT edema, max sinus tenderness, b/l TM clear   Neck: no cervical CAITLYN  Heart: RRR no murmur, no edema   Lungs: CTA b/l, no wheeze or rhonchi   GI: abd soft, nontender, nondistended.   MSK: no c/c/e. nml gait   Pain along R calcaneus/achilles  Nml ROM/strength  No deformity   Skin: warm and dry  Psych: cooperative, appropriate affect  Neuro: speech clear. A&Ox3  Assessment/Plan   Problem List Items Addressed This Visit       Anxiety  Stable RF Rx       Relevant Medications    buPROPion XL (Wellbutrin " XL) 150 mg 24 hr tablet    FLUoxetine (PROzac) 10 mg capsule    hydrOXYzine HCL (Atarax) 10 mg tablet    Diabetes mellitus type 2, uncomplicated (CMS/HCC)  Check labs when fasting  Cont jardiance     Relevant Medications    empagliflozin (Jardiance) 25 mg    Other Relevant Orders    Comprehensive Metabolic Panel    CBC and Auto Differential    Hemoglobin A1C    Lipid Panel    GERD (gastroesophageal reflux disease)  Controlled w PPI, refilled     Relevant Medications    omeprazole (PriLOSEC) 40 mg DR capsule    Hyperlipidemia  Labs when fasting  Cont statin/zetia     Relevant Medications    atorvastatin (Lipitor) 40 mg tablet    ezetimibe (Zetia) 10 mg tablet    Ulcerative colitis (CMS/HCC)  Stable w mesalamine   C-scope UTD      Other Visit Diagnoses       Osteopenia, unspecified location      Rpt DEXA 11/2024       Premature menopause on hormone replacement therapy      Sp BSO MOMO   RF estrogen     Relevant Medications    estradiol (Estrace) 0.5 mg tablet    Hypertension, unspecified type  Controlled   RF triamtrene/hydrochlorothiazide     Relevant Medications    triamterene-hydrochlorothiazid (Dyazide) 37.5-25 mg capsule    Bacterial sinusitis      Start augmentin  Diflucan in case of yeast     Relevant Medications    amoxicillin-pot clavulanate (Augmentin) 875-125 mg tablet    fluconazole (Diflucan) 150 mg tablet    Pain of right lower extremity      Xray neg  Pt will f/up w ortho, already est     CPE 6 mo labs prior

## 2024-01-18 DIAGNOSIS — K51.919 ULCERATIVE COLITIS WITH COMPLICATION, UNSPECIFIED LOCATION (MULTI): Primary | ICD-10-CM

## 2024-01-18 RX ORDER — MESALAMINE 800 MG/1
TABLET, DELAYED RELEASE ORAL
Qty: 180 TABLET | Refills: 1 | Status: SHIPPED | OUTPATIENT
Start: 2024-01-18 | End: 2024-01-29 | Stop reason: SDUPTHER

## 2024-01-29 DIAGNOSIS — K51.919 ULCERATIVE COLITIS WITH COMPLICATION, UNSPECIFIED LOCATION (MULTI): ICD-10-CM

## 2024-01-29 RX ORDER — MESALAMINE 800 MG/1
1600 TABLET, DELAYED RELEASE ORAL 2 TIMES DAILY
Qty: 360 TABLET | Refills: 1 | Status: SHIPPED | OUTPATIENT
Start: 2024-01-29

## 2024-02-01 ENCOUNTER — PATIENT MESSAGE (OUTPATIENT)
Dept: PRIMARY CARE | Facility: CLINIC | Age: 49
End: 2024-02-01
Payer: COMMERCIAL

## 2024-02-01 DIAGNOSIS — B37.31 YEAST VAGINITIS: Primary | ICD-10-CM

## 2024-02-01 RX ORDER — FLUCONAZOLE 150 MG/1
150 TABLET ORAL ONCE
Qty: 1 TABLET | Refills: 0 | Status: SHIPPED | OUTPATIENT
Start: 2024-02-01 | End: 2024-02-01

## 2024-03-11 ENCOUNTER — TELEPHONE (OUTPATIENT)
Dept: PRIMARY CARE | Facility: CLINIC | Age: 49
End: 2024-03-11
Payer: COMMERCIAL

## 2024-03-11 NOTE — TELEPHONE ENCOUNTER
Onset 2/25 upset, sour stomach after eating.  Then loose stool and frequent urgency.  Feel like bladder is full all the time, even when able to empty it.

## 2024-03-12 ENCOUNTER — LAB (OUTPATIENT)
Dept: LAB | Facility: LAB | Age: 49
End: 2024-03-12
Payer: COMMERCIAL

## 2024-03-12 ENCOUNTER — OFFICE VISIT (OUTPATIENT)
Dept: PRIMARY CARE | Facility: CLINIC | Age: 49
End: 2024-03-12
Payer: COMMERCIAL

## 2024-03-12 VITALS
TEMPERATURE: 97.4 F | SYSTOLIC BLOOD PRESSURE: 136 MMHG | WEIGHT: 210 LBS | OXYGEN SATURATION: 99 % | RESPIRATION RATE: 17 BRPM | HEIGHT: 61 IN | HEART RATE: 70 BPM | BODY MASS INDEX: 39.65 KG/M2 | DIASTOLIC BLOOD PRESSURE: 90 MMHG

## 2024-03-12 DIAGNOSIS — R74.8 ELEVATED ALKALINE PHOSPHATASE LEVEL: ICD-10-CM

## 2024-03-12 DIAGNOSIS — E11.9 TYPE 2 DIABETES MELLITUS WITHOUT COMPLICATION, WITHOUT LONG-TERM CURRENT USE OF INSULIN (MULTI): ICD-10-CM

## 2024-03-12 DIAGNOSIS — R14.0 ABDOMINAL BLOATING: ICD-10-CM

## 2024-03-12 DIAGNOSIS — K58.0 IRRITABLE BOWEL SYNDROME WITH DIARRHEA: ICD-10-CM

## 2024-03-12 DIAGNOSIS — R35.0 URINARY FREQUENCY: Primary | ICD-10-CM

## 2024-03-12 LAB
ALBUMIN SERPL BCP-MCNC: 4.3 G/DL (ref 3.4–5)
ALP SERPL-CCNC: 134 U/L (ref 33–110)
ALT SERPL W P-5'-P-CCNC: 28 U/L (ref 7–45)
ANION GAP SERPL CALC-SCNC: 17 MMOL/L (ref 10–20)
AST SERPL W P-5'-P-CCNC: 28 U/L (ref 9–39)
BASOPHILS # BLD AUTO: 0.02 X10*3/UL (ref 0–0.1)
BASOPHILS NFR BLD AUTO: 0.3 %
BILIRUB SERPL-MCNC: 0.9 MG/DL (ref 0–1.2)
BUN SERPL-MCNC: 15 MG/DL (ref 6–23)
CALCIUM SERPL-MCNC: 9.8 MG/DL (ref 8.6–10.6)
CHLORIDE SERPL-SCNC: 101 MMOL/L (ref 98–107)
CHOLEST SERPL-MCNC: 192 MG/DL (ref 0–199)
CHOLESTEROL/HDL RATIO: 3.5
CO2 SERPL-SCNC: 24 MMOL/L (ref 21–32)
CREAT SERPL-MCNC: 0.7 MG/DL (ref 0.5–1.05)
EGFRCR SERPLBLD CKD-EPI 2021: >90 ML/MIN/1.73M*2
EOSINOPHIL # BLD AUTO: 0.07 X10*3/UL (ref 0–0.7)
EOSINOPHIL NFR BLD AUTO: 1 %
ERYTHROCYTE [DISTWIDTH] IN BLOOD BY AUTOMATED COUNT: 13.9 % (ref 11.5–14.5)
EST. AVERAGE GLUCOSE BLD GHB EST-MCNC: 169 MG/DL
GLUCOSE SERPL-MCNC: 155 MG/DL (ref 74–99)
HBA1C MFR BLD: 7.5 %
HCT VFR BLD AUTO: 47.7 % (ref 36–46)
HDLC SERPL-MCNC: 54.5 MG/DL
HGB BLD-MCNC: 14.4 G/DL (ref 12–16)
IMM GRANULOCYTES # BLD AUTO: 0.02 X10*3/UL (ref 0–0.7)
IMM GRANULOCYTES NFR BLD AUTO: 0.3 % (ref 0–0.9)
LDLC SERPL CALC-MCNC: 88 MG/DL
LYMPHOCYTES # BLD AUTO: 1.77 X10*3/UL (ref 1.2–4.8)
LYMPHOCYTES NFR BLD AUTO: 26 %
MCH RBC QN AUTO: 26.7 PG (ref 26–34)
MCHC RBC AUTO-ENTMCNC: 30.2 G/DL (ref 32–36)
MCV RBC AUTO: 88 FL (ref 80–100)
MONOCYTES # BLD AUTO: 0.54 X10*3/UL (ref 0.1–1)
MONOCYTES NFR BLD AUTO: 7.9 %
NEUTROPHILS # BLD AUTO: 4.4 X10*3/UL (ref 1.2–7.7)
NEUTROPHILS NFR BLD AUTO: 64.5 %
NON HDL CHOLESTEROL: 138 MG/DL (ref 0–149)
NRBC BLD-RTO: 0 /100 WBCS (ref 0–0)
PLATELET # BLD AUTO: 372 X10*3/UL (ref 150–450)
POC APPEARANCE, URINE: CLEAR
POC BILIRUBIN, URINE: ABNORMAL
POC BLOOD, URINE: NEGATIVE
POC COLOR, URINE: YELLOW
POC GLUCOSE, URINE: ABNORMAL MG/DL
POC KETONES, URINE: NEGATIVE MG/DL
POC LEUKOCYTES, URINE: NEGATIVE
POC NITRITE,URINE: NEGATIVE
POC PH, URINE: 6 PH
POC PROTEIN, URINE: NEGATIVE MG/DL
POC SPECIFIC GRAVITY, URINE: 1.01
POC UROBILINOGEN, URINE: 0.2 EU/DL
POTASSIUM SERPL-SCNC: 3.9 MMOL/L (ref 3.5–5.3)
PROT SERPL-MCNC: 7.1 G/DL (ref 6.4–8.2)
RBC # BLD AUTO: 5.4 X10*6/UL (ref 4–5.2)
SODIUM SERPL-SCNC: 138 MMOL/L (ref 136–145)
TRIGL SERPL-MCNC: 250 MG/DL (ref 0–149)
VLDL: 50 MG/DL (ref 0–40)
WBC # BLD AUTO: 6.8 X10*3/UL (ref 4.4–11.3)

## 2024-03-12 PROCEDURE — 86003 ALLG SPEC IGE CRUDE XTRC EA: CPT

## 2024-03-12 PROCEDURE — 99214 OFFICE O/P EST MOD 30 MIN: CPT | Performed by: FAMILY MEDICINE

## 2024-03-12 PROCEDURE — 83036 HEMOGLOBIN GLYCOSYLATED A1C: CPT

## 2024-03-12 PROCEDURE — 82306 VITAMIN D 25 HYDROXY: CPT

## 2024-03-12 PROCEDURE — 1036F TOBACCO NON-USER: CPT | Performed by: FAMILY MEDICINE

## 2024-03-12 PROCEDURE — 80061 LIPID PANEL: CPT

## 2024-03-12 PROCEDURE — 36415 COLL VENOUS BLD VENIPUNCTURE: CPT

## 2024-03-12 PROCEDURE — 85025 COMPLETE CBC W/AUTO DIFF WBC: CPT

## 2024-03-12 PROCEDURE — 81002 URINALYSIS NONAUTO W/O SCOPE: CPT | Performed by: FAMILY MEDICINE

## 2024-03-12 PROCEDURE — 3080F DIAST BP >= 90 MM HG: CPT | Performed by: FAMILY MEDICINE

## 2024-03-12 PROCEDURE — 80053 COMPREHEN METABOLIC PANEL: CPT

## 2024-03-12 PROCEDURE — 3075F SYST BP GE 130 - 139MM HG: CPT | Performed by: FAMILY MEDICINE

## 2024-03-12 RX ORDER — HYOSCYAMINE SULFATE 0.125 MG
0.12 TABLET ORAL EVERY 4 HOURS PRN
Qty: 30 TABLET | Refills: 2 | Status: SHIPPED | OUTPATIENT
Start: 2024-03-12 | End: 2024-03-22

## 2024-03-12 RX ORDER — DULOXETIN HYDROCHLORIDE 30 MG/1
30 CAPSULE, DELAYED RELEASE ORAL DAILY
COMMUNITY
Start: 2020-03-23

## 2024-03-12 NOTE — PROGRESS NOTES
Subjective   Richard Contreras is a 48 y.o. female who presents for Abdominal Pain (Since 2/25/24).  HPI    Hx UC on melsalamine, sp MOMO BSO, colon polys c-scope 3/2023     GI upset that started 2/25 after going out to eat. Felt a little better next day but then returned. Unsure if food intol and tried to avoid carbonation and citrus.   Took levsin and helped.   Bladder feels full and that she's not able fully empty  No fever chills HA. +nausea but no vomiting.   3 Bms/day all loose stool.   No wt loss.     Current Outpatient Medications on File Prior to Visit   Medication Sig Dispense Refill    acetaminophen (Tylenol) 325 mg tablet Take 1 tablet (325 mg) by mouth if needed.      atorvastatin (Lipitor) 40 mg tablet Take 1 tablet (40 mg) by mouth once daily at bedtime. 90 tablet 3    buPROPion XL (Wellbutrin XL) 150 mg 24 hr tablet Take 1 tablet (150 mg) by mouth once daily. 90 tablet 3    DULoxetine (Cymbalta) 30 mg DR capsule Take 1 capsule (30 mg) by mouth once daily.      empagliflozin (Jardiance) 25 mg Take 1 tablet (25 mg) by mouth once daily. 90 tablet 3    estradiol (Estrace) 0.5 mg tablet Take 1 tablet (0.5 mg) by mouth once daily. 90 tablet 3    ezetimibe (Zetia) 10 mg tablet Take 1 tablet (10 mg) by mouth once daily at bedtime. 90 tablet 3    FLUoxetine (PROzac) 10 mg capsule Take 1 capsule (10 mg) by mouth once daily. 90 capsule 3    gabapentin (Gralise) 300 mg tablet extended release 24 hr Take 1 tablet (300 mg) by mouth once daily.      gabapentin (Gralise) 600 mg tablet extended release 24 hr Take 600 mg by mouth 1 (one) time each day.      hydrOXYzine HCL (Atarax) 10 mg tablet Take 1-2 tablets (10-20 mg) by mouth 2 times a day as needed for anxiety. 180 tablet 1    mesalamine (Asacol) 800 mg EC tablet Take 2 tablets (1,600 mg) by mouth 2 times a day. Take 2 tablets by mouth once daily 360 tablet 1    naratriptan (Amerge) 2.5 mg tablet Take 1 tablet (2.5 mg) by mouth 1 time if needed.      Nurtec ODT  "75 mg tablet,disintegrating DISSOLVE ONE TABLET IN MOUTH once DAILY as needed for migraines. 8 tablet 3    omeprazole (PriLOSEC) 40 mg DR capsule Take 1 capsule (40 mg) by mouth once daily in the morning. Take before meals. Do not crush or chew. 90 capsule 3    ondansetron (Zofran) 4 mg tablet Take 1 tablet (4 mg) by mouth every 6 hours if needed for nausea.      rizatriptan (Maxalt) 10 mg tablet Take by mouth. TAKE 1 TABLET BY MOUTH AS NEEDED FOR MIGRAINE HEADACHE (SEE ADMINISTRATION INSTRUCTIONS). MAY REPEAT IN 2 HOURS IF NEEDED.      triamterene-hydrochlorothiazid (Dyazide) 37.5-25 mg capsule Take 1 capsule by mouth once daily. 90 capsule 3    ascorbic acid (Vitamin C) 500 mg tablet Take by mouth twice a day.      benzonatate (Tessalon) 100 mg capsule Take 1 capsule (100 mg) by mouth 3 times a day as needed.      [DISCONTINUED] nitrofurantoin, macrocrystal-monohydrate, (Macrobid) 100 mg capsule Take 1 capsule (100 mg) by mouth every 12 hours.       No current facility-administered medications on file prior to visit.                  Objective   /90 (BP Location: Left arm)   Pulse 70   Temp 36.3 °C (97.4 °F)   Resp 17   Ht 1.549 m (5' 1\")   Wt 95.3 kg (210 lb)   SpO2 99%   BMI 39.68 kg/m²    Physical Exam  General: NAD  HEENT:NCAT, PERRLA, nml OP  Neck: no cervical CAITLYN  Heart: RRR no murmur, no edema   Lungs: CTA b/l, no wheeze or rhonchi   GI: abd soft, mid abd tenderness w deep palpation nondistended.   MSK: no c/c/e. nml gait   Skin: warm and dry  Psych: cooperative, appropriate affect  Neuro: speech clear. A&Ox3  Assessment/Plan   Problem List Items Addressed This Visit       Diabetes mellitus type 2, uncomplicated (CMS/HCC)  Due for labs, will check since getting blood work today     Relevant Orders    Hemoglobin A1C    Lipid Panel     Other Visit Diagnoses       Urinary frequency    -  Primary  UA nml (glu d/t jardiance)      Relevant Orders    POCT UA (nonautomated) manually resulted " (Completed)    Abdominal bloating      Hx of UC but Sx are not c/w UC (no bleeding or wt loss) compliant w melsalamine   Considered IBS-D flare (responded to levsin) vs ?gastroparesis (Hx DM2)  Check labs  [facility] Rx xifaxan x 2 wk for IBS-D/abd bloating   Levsin prn  F/up GI as ron next mo  Consider CT if no improvement  Red flag signs and return precautions discussed   Pt requested allergy testing     Relevant Orders    CBC and Auto Differential    Comprehensive Metabolic Panel    Food Allergy Profile IgE    Irritable bowel syndrome with diarrhea        Relevant Medications    hyoscyamine (Levsin) 0.125 mg tablet    rifAXIMin (Xifaxan) 550 mg tablet

## 2024-03-13 ENCOUNTER — TELEPHONE (OUTPATIENT)
Dept: PRIMARY CARE | Facility: CLINIC | Age: 49
End: 2024-03-13
Payer: COMMERCIAL

## 2024-03-13 DIAGNOSIS — R74.8 ELEVATED ALKALINE PHOSPHATASE LEVEL: Primary | ICD-10-CM

## 2024-03-13 LAB
25(OH)D3 SERPL-MCNC: 43 NG/ML (ref 30–100)
CLAM IGE QN: <0.1 KU/L
CODFISH IGE QN: <0.1 KU/L
CORN IGE QN: <0.1
EGG WHITE IGE QN: <0.1 KU/L
MILK IGE QN: <0.1 KU/L
PEANUT IGE QN: <0.1 KU/L
SCALLOP IGE QN: <0.1 KU/L
SESAME SEED IGE QN: <0.1 KU/L
SHRIMP IGE QN: <0.1 KU/L
SOYBEAN IGE QN: <0.1 KU/L
WALNUT IGE QN: <0.1 KU/L
WHEAT IGE QN: <0.1 KU/L

## 2024-03-13 NOTE — TELEPHONE ENCOUNTER
Sugar has increased which will can also increased her triglycerdries.   We can discuss options at her upcoming appt    No other labs abnml to explain her abd pain.

## 2024-04-23 ENCOUNTER — APPOINTMENT (OUTPATIENT)
Dept: GASTROENTEROLOGY | Facility: HOSPITAL | Age: 49
End: 2024-04-23
Payer: COMMERCIAL

## 2024-05-01 ENCOUNTER — ANESTHESIA EVENT (OUTPATIENT)
Dept: OPERATING ROOM | Facility: HOSPITAL | Age: 49
End: 2024-05-01
Payer: COMMERCIAL

## 2024-05-03 ENCOUNTER — HOSPITAL ENCOUNTER (OUTPATIENT)
Facility: HOSPITAL | Age: 49
Setting detail: OUTPATIENT SURGERY
Discharge: HOME | End: 2024-05-03
Attending: PODIATRIST | Admitting: PODIATRIST
Payer: COMMERCIAL

## 2024-05-03 ENCOUNTER — APPOINTMENT (OUTPATIENT)
Dept: RADIOLOGY | Facility: HOSPITAL | Age: 49
End: 2024-05-03
Payer: COMMERCIAL

## 2024-05-03 ENCOUNTER — ANESTHESIA (OUTPATIENT)
Dept: OPERATING ROOM | Facility: HOSPITAL | Age: 49
End: 2024-05-03
Payer: COMMERCIAL

## 2024-05-03 VITALS
WEIGHT: 209.44 LBS | OXYGEN SATURATION: 97 % | DIASTOLIC BLOOD PRESSURE: 90 MMHG | SYSTOLIC BLOOD PRESSURE: 152 MMHG | TEMPERATURE: 97.9 F | RESPIRATION RATE: 21 BRPM | BODY MASS INDEX: 39.54 KG/M2 | HEIGHT: 61 IN | HEART RATE: 83 BPM

## 2024-05-03 DIAGNOSIS — M25.571 RIGHT ANKLE PAIN: Primary | ICD-10-CM

## 2024-05-03 LAB
GLUCOSE BLD MANUAL STRIP-MCNC: 127 MG/DL (ref 74–99)
GLUCOSE BLD MANUAL STRIP-MCNC: 151 MG/DL (ref 74–99)

## 2024-05-03 PROCEDURE — 2720000007 HC OR 272 NO HCPCS: Performed by: PODIATRIST

## 2024-05-03 PROCEDURE — 2500000004 HC RX 250 GENERAL PHARMACY W/ HCPCS (ALT 636 FOR OP/ED): Performed by: ANESTHESIOLOGY

## 2024-05-03 PROCEDURE — 3700000002 HC GENERAL ANESTHESIA TIME - EACH INCREMENTAL 1 MINUTE: Performed by: PODIATRIST

## 2024-05-03 PROCEDURE — 82947 ASSAY GLUCOSE BLOOD QUANT: CPT

## 2024-05-03 PROCEDURE — 2500000005 HC RX 250 GENERAL PHARMACY W/O HCPCS: Performed by: PODIATRIST

## 2024-05-03 PROCEDURE — 76000 FLUOROSCOPY <1 HR PHYS/QHP: CPT | Mod: 59

## 2024-05-03 PROCEDURE — 2500000004 HC RX 250 GENERAL PHARMACY W/ HCPCS (ALT 636 FOR OP/ED): Performed by: NURSE ANESTHETIST, CERTIFIED REGISTERED

## 2024-05-03 PROCEDURE — 7100000010 HC PHASE TWO TIME - EACH INCREMENTAL 1 MINUTE: Performed by: PODIATRIST

## 2024-05-03 PROCEDURE — 7100000002 HC RECOVERY ROOM TIME - EACH INCREMENTAL 1 MINUTE: Performed by: PODIATRIST

## 2024-05-03 PROCEDURE — 3700000001 HC GENERAL ANESTHESIA TIME - INITIAL BASE CHARGE: Performed by: PODIATRIST

## 2024-05-03 PROCEDURE — 3600000008 HC OR TIME - EACH INCREMENTAL 1 MINUTE - PROCEDURE LEVEL THREE: Performed by: PODIATRIST

## 2024-05-03 PROCEDURE — 2780000003 HC OR 278 NO HCPCS: Performed by: PODIATRIST

## 2024-05-03 PROCEDURE — 3600000003 HC OR TIME - INITIAL BASE CHARGE - PROCEDURE LEVEL THREE: Performed by: PODIATRIST

## 2024-05-03 PROCEDURE — 7100000009 HC PHASE TWO TIME - INITIAL BASE CHARGE: Performed by: PODIATRIST

## 2024-05-03 PROCEDURE — C1713 ANCHOR/SCREW BN/BN,TIS/BN: HCPCS | Performed by: PODIATRIST

## 2024-05-03 PROCEDURE — A29891 PR ANKLE SCOPE,EXCIS OSTEOCHON DEFCT: Performed by: ANESTHESIOLOGY

## 2024-05-03 PROCEDURE — 2500000001 HC RX 250 WO HCPCS SELF ADMINISTERED DRUGS (ALT 637 FOR MEDICARE OP): Performed by: ANESTHESIOLOGY

## 2024-05-03 PROCEDURE — 7100000001 HC RECOVERY ROOM TIME - INITIAL BASE CHARGE: Performed by: PODIATRIST

## 2024-05-03 PROCEDURE — 2500000004 HC RX 250 GENERAL PHARMACY W/ HCPCS (ALT 636 FOR OP/ED): Mod: JZ | Performed by: STUDENT IN AN ORGANIZED HEALTH CARE EDUCATION/TRAINING PROGRAM

## 2024-05-03 PROCEDURE — A29891 PR ANKLE SCOPE,EXCIS OSTEOCHON DEFCT: Performed by: NURSE ANESTHETIST, CERTIFIED REGISTERED

## 2024-05-03 RX ORDER — BUPIVACAINE HCL/EPINEPHRINE 0.5-1:200K
VIAL (ML) INJECTION AS NEEDED
Status: DISCONTINUED | OUTPATIENT
Start: 2024-05-03 | End: 2024-05-03 | Stop reason: HOSPADM

## 2024-05-03 RX ORDER — ONDANSETRON HYDROCHLORIDE 2 MG/ML
4 INJECTION, SOLUTION INTRAVENOUS ONCE AS NEEDED
Status: COMPLETED | OUTPATIENT
Start: 2024-05-03 | End: 2024-05-03

## 2024-05-03 RX ORDER — FENTANYL CITRATE 50 UG/ML
INJECTION, SOLUTION INTRAMUSCULAR; INTRAVENOUS AS NEEDED
Status: DISCONTINUED | OUTPATIENT
Start: 2024-05-03 | End: 2024-05-03

## 2024-05-03 RX ORDER — OXYCODONE HYDROCHLORIDE 5 MG/1
5 TABLET ORAL EVERY 4 HOURS PRN
Status: DISCONTINUED | OUTPATIENT
Start: 2024-05-03 | End: 2024-05-03 | Stop reason: HOSPADM

## 2024-05-03 RX ORDER — DIPHENHYDRAMINE HYDROCHLORIDE 50 MG/ML
25 INJECTION INTRAMUSCULAR; INTRAVENOUS ONCE
Status: COMPLETED | OUTPATIENT
Start: 2024-05-03 | End: 2024-05-03

## 2024-05-03 RX ORDER — SCOLOPAMINE TRANSDERMAL SYSTEM 1 MG/1
PATCH, EXTENDED RELEASE TRANSDERMAL AS NEEDED
Status: DISCONTINUED | OUTPATIENT
Start: 2024-05-03 | End: 2024-05-03

## 2024-05-03 RX ORDER — SODIUM CHLORIDE, SODIUM LACTATE, POTASSIUM CHLORIDE, CALCIUM CHLORIDE 600; 310; 30; 20 MG/100ML; MG/100ML; MG/100ML; MG/100ML
100 INJECTION, SOLUTION INTRAVENOUS CONTINUOUS
Status: DISCONTINUED | OUTPATIENT
Start: 2024-05-03 | End: 2024-05-03 | Stop reason: HOSPADM

## 2024-05-03 RX ORDER — DROPERIDOL 2.5 MG/ML
0.62 INJECTION, SOLUTION INTRAMUSCULAR; INTRAVENOUS ONCE AS NEEDED
Status: DISCONTINUED | OUTPATIENT
Start: 2024-05-03 | End: 2024-05-03 | Stop reason: HOSPADM

## 2024-05-03 RX ORDER — PROPOFOL 10 MG/ML
INJECTION, EMULSION INTRAVENOUS CONTINUOUS PRN
Status: DISCONTINUED | OUTPATIENT
Start: 2024-05-03 | End: 2024-05-03

## 2024-05-03 RX ORDER — HYDROMORPHONE HYDROCHLORIDE 2 MG/ML
INJECTION, SOLUTION INTRAMUSCULAR; INTRAVENOUS; SUBCUTANEOUS AS NEEDED
Status: DISCONTINUED | OUTPATIENT
Start: 2024-05-03 | End: 2024-05-03

## 2024-05-03 RX ORDER — ONDANSETRON HYDROCHLORIDE 2 MG/ML
INJECTION, SOLUTION INTRAVENOUS AS NEEDED
Status: DISCONTINUED | OUTPATIENT
Start: 2024-05-03 | End: 2024-05-03

## 2024-05-03 RX ORDER — CLINDAMYCIN PHOSPHATE 900 MG/50ML
900 INJECTION, SOLUTION INTRAVENOUS ONCE
Status: COMPLETED | OUTPATIENT
Start: 2024-05-03 | End: 2024-05-03

## 2024-05-03 RX ORDER — MIDAZOLAM HYDROCHLORIDE 1 MG/ML
INJECTION INTRAMUSCULAR; INTRAVENOUS AS NEEDED
Status: DISCONTINUED | OUTPATIENT
Start: 2024-05-03 | End: 2024-05-03

## 2024-05-03 RX ADMIN — ONDANSETRON 4 MG: 2 INJECTION INTRAMUSCULAR; INTRAVENOUS at 16:44

## 2024-05-03 RX ADMIN — FENTANYL CITRATE 100 MCG: 50 INJECTION, SOLUTION INTRAMUSCULAR; INTRAVENOUS at 14:25

## 2024-05-03 RX ADMIN — HYDROMORPHONE HYDROCHLORIDE 0.5 MG: 2 INJECTION, SOLUTION INTRAMUSCULAR; INTRAVENOUS; SUBCUTANEOUS at 16:16

## 2024-05-03 RX ADMIN — DIPHENHYDRAMINE HYDROCHLORIDE 25 MG: 50 INJECTION, SOLUTION INTRAMUSCULAR; INTRAVENOUS at 17:22

## 2024-05-03 RX ADMIN — PROMETHAZINE HYDROCHLORIDE 6.25 MG: 25 INJECTION INTRAMUSCULAR; INTRAVENOUS at 16:54

## 2024-05-03 RX ADMIN — HYDROMORPHONE HYDROCHLORIDE 0.5 MG: 2 INJECTION, SOLUTION INTRAMUSCULAR; INTRAVENOUS; SUBCUTANEOUS at 15:33

## 2024-05-03 RX ADMIN — HYDROMORPHONE HYDROCHLORIDE 0.5 MG: 2 INJECTION, SOLUTION INTRAMUSCULAR; INTRAVENOUS; SUBCUTANEOUS at 16:17

## 2024-05-03 RX ADMIN — DEXAMETHASONE SODIUM PHOSPHATE 4 MG: 4 INJECTION INTRA-ARTICULAR; INTRALESIONAL; INTRAMUSCULAR; INTRAVENOUS; SOFT TISSUE at 14:54

## 2024-05-03 RX ADMIN — HYDROMORPHONE HYDROCHLORIDE 0.5 MG: 1 INJECTION, SOLUTION INTRAMUSCULAR; INTRAVENOUS; SUBCUTANEOUS at 16:45

## 2024-05-03 RX ADMIN — MIDAZOLAM HYDROCHLORIDE 2 MG: 1 INJECTION, SOLUTION INTRAMUSCULAR; INTRAVENOUS at 14:21

## 2024-05-03 RX ADMIN — CLINDAMYCIN IN 5 PERCENT DEXTROSE 900 MG: 18 INJECTION, SOLUTION INTRAVENOUS at 14:35

## 2024-05-03 RX ADMIN — SCOLOPAMINE TRANSDERMAL SYSTEM 1 PATCH: 1 PATCH, EXTENDED RELEASE TRANSDERMAL at 14:20

## 2024-05-03 RX ADMIN — ONDANSETRON 4 MG: 2 INJECTION, SOLUTION INTRAMUSCULAR; INTRAVENOUS at 14:54

## 2024-05-03 RX ADMIN — OXYCODONE HYDROCHLORIDE 5 MG: 5 TABLET ORAL at 17:10

## 2024-05-03 RX ADMIN — SODIUM CHLORIDE, POTASSIUM CHLORIDE, SODIUM LACTATE AND CALCIUM CHLORIDE: 600; 310; 30; 20 INJECTION, SOLUTION INTRAVENOUS at 16:05

## 2024-05-03 RX ADMIN — HYDROMORPHONE HYDROCHLORIDE 0.5 MG: 1 INJECTION, SOLUTION INTRAMUSCULAR; INTRAVENOUS; SUBCUTANEOUS at 17:36

## 2024-05-03 RX ADMIN — SODIUM CHLORIDE, POTASSIUM CHLORIDE, SODIUM LACTATE AND CALCIUM CHLORIDE 100 ML/HR: 600; 310; 30; 20 INJECTION, SOLUTION INTRAVENOUS at 12:47

## 2024-05-03 SDOH — HEALTH STABILITY: MENTAL HEALTH: CURRENT SMOKER: 0

## 2024-05-03 ASSESSMENT — PAIN - FUNCTIONAL ASSESSMENT
PAIN_FUNCTIONAL_ASSESSMENT: 0-10

## 2024-05-03 ASSESSMENT — PAIN SCALES - GENERAL
PAINLEVEL_OUTOF10: 2
PAINLEVEL_OUTOF10: 8
PAINLEVEL_OUTOF10: 8
PAIN_LEVEL: 2
PAINLEVEL_OUTOF10: 10 - WORST POSSIBLE PAIN
PAINLEVEL_OUTOF10: 7

## 2024-05-03 NOTE — OP NOTE
RIGHT ANKLE ARTHROSCOPIC JOINT DEBRIDEMENT WITH MICROFRACTURE (R) Operative Note     Date: 5/3/2024  OR Location: [facility] OR    Name: Richard Contreras, : 1975, Age: 48 y.o., MRN: [id number], Sex: female    Diagnosis  Pre-op Diagnosis     * Achilles tendinitis, right leg [M76.61] Post-op Diagnosis     * Achilles tendinitis, right leg [M76.61]     Procedures  18220 right ankle extensive arthroscopic joint debridement with microfracture of talar dome lesion  51901 right ankle stress films  91828 reconstruction repair of lateral collateral ligament of the anterior talofibular ligament and calcaneofibular ligament right side  67371 plantar fasciotomy right side  51396 Achilles tendon debridement right side  52274 application right posterior splint right side  Harvesting PRP with injections of ankle joint, posterior tibial tendon, peroneal tendons, plantar fascia and Achilles tendon    Surgeons      * Prakash Otero - Primary    Resident/Fellow/Other Assistant:  Surgeons and Role: Austen liu D.P.M.4  * No surgeons found with a matching role *    Procedure Summary  Anesthesia: Consult  ASA: III  Anesthesia Staff: Anesthesiologist: Wade Ribera MD  CRNA: RAYRAY Hi-CRNA; RAYRAY Hathaway-CRNA; RAYRAY Rodriguez-CRNA  Estimated Blood Loss: 20 mL  Intra-op Medications: Administrations occurring from 1315 to 1430 on 24:  * No intraprocedure medications in log *           Anesthesia Record               Intraprocedure I/O Totals          Intake    lactated Ringer's infusion 900.00 mL    Total Intake 900 mL          Specimen: No specimens collected     Staff:   Circulator: Johanne Haskins RN; Deepa Pond RN  Relief Circulator: Yue Maldonado RN  Relief Scrub: Nadira Peña RN  Scrub Person: Landy Shea; Lucy Myers         Drains and/or Catheters: *No drain catheter*    Tourniquet Times: Tourniquet time for approximately 1 hour 10 minutes at 250 mm's    Total Tourniquet  Time Documented:  Thigh (Left) - 72 minutes  Total: Thigh (Left) - 72 minutes      Implants: [employer] Raptor might 3.0 anchors    Findings: Severely unstable ankle during stress films.  Large osteochondral defect of the ankle.    Indications: Richard Contreras is an 48 y.o. female who is having surgery for Achilles tendinitis, right leg [M76.61].  Patient understands the indication risk and benefits of surgery all risk and benefits thoroughly explained to her with risk and benefits in the office notes.  She still want to proceed with surgery states her ankle just has not been good since December 2023.  Risk ultimately include pain, infection, numbness, tingling, burning, unstable ankle, and ankle arthritis, need for future surgery possible total ankle joint replacement or ankle joint effusion, DVT, PE, pain out of proportion, RSD and other unforeseen complications.    The patient was seen in the preoperative area. The risks, benefits, complications, treatment options, non-operative alternatives, expected recovery and outcomes were discussed with the patient. The possibilities of reaction to medication, pulmonary aspiration, injury to surrounding structures, bleeding, recurrent infection, the need for additional procedures, failure to diagnose a condition, and creating a complication requiring transfusion or operation were discussed with the patient. The patient concurred with the proposed plan, giving informed consent.  The site of surgery was properly noted/marked if necessary per policy. The patient has been actively warmed in preoperative area. Preoperative antibiotics have been ordered and given within 1 hours of incision. Venous thrombosis prophylaxis have been ordered including chemical prophylaxis    Procedure Details: Patient seen in preop holding.  Consent signed.  H&P completed.  IV antibiotics delivered.  Extremity was sterilely prepped and draped from the toes to the knee utilizing Betadine  soap and Betadine paint.  Thigh tourniquet was applied prior to this.  IV antibiotics were delivered.  Another timeout was performed.    Under C-arm we are able to do stress ankle films and we had significant anterior drawer test and instability with inversion of the ankle with instability.    We now started our procedure.  We exsanguinated leg let the tourniquet up.  I made her anterior medial and anterior lateral portals in the safe zones.  We injected with 10 cc of saline solution and sized with a 15 blade deepened with a hemostat and introduced our small operating cannula.  We are away from the lateral superficial peroneal nerve lateral intermediate branch and away from the saphenous vein and tibialis anterior tendon.  We introduced our small obturator cannula with our [facility] Coude synovator instrumentation.  We spent approximately 30 minutes trying to remove the significant amount of synovitis, impingement syndrome of the medial and lateral ankle joint and there was a large unstable medial chondral dome lesion medially midpoint.  We extensively debrided and removed the free-floating cartilage.  There was significant exposure of the subchondral bone and we are able to extensively debride this removing the fringe of unhealthy unstable cartilage and clean out the subchondral bone and smooth this out.  Using aawl were able to microfracture of the large defect.  We then took an up bite and cleaned fringes of the lesion and then we are able to extensively decompress the anterior medial aspect of the tibia so there will be no impaction.    After extensively moving the soft tissue from the ankle we are able to remove her instrumentation and closed with a 3-0 Prolene suture.    Attention now was placed to make a curvilinear incision posterior to the lateral malleolus.  This extended 5 to 6 cm.  Went distal to the fibula 1-1/2 cm but proximal to the sinus tarsi.  We made an incision through the relaxed contention lines.   We made our incision immediately visualized that there was little fatty tissue in this area and the extensor retinaculum was disrupted and had attenuation midpoint.  We did a semielliptical incision along the ATF ligament and remove the significant amount of fluid.  The ATF ligament was very inconsistent and varies thin and very unhealthy.    Were able to open up the peroneal tendon sheaths the peroneal tendons looked healthy we reflected the peroneal tendon sheath and the calcaneofibular ligament also was attenuated partially torn and stretched out.    We took a semielliptical incision approximately 1-1/2 cm from both ligaments we held the foot in neutral position we able to place a [employer] Raptor might 3.0 screw anchor system into the calcaneus and into the distal fibula.  We did a modified pants and best repair of both ligaments utilizing the oh nonabsorbable suture.    We augmented this with a #2-0 Vicryl suture pants over vest given his great stability of the ankle joint.  We mobilized the skin and then were able to closed full-thickness with 3-0 Prolene suture and gentle sutures.    Utilizing the [employer] instrumentation we are able to make approximately 50 puncture holes on the plantar surface of the calcaneus at the origin of plantar fascia and introduce our Topaz instrumentation penetrating and releasing scar tissue from the plantar fascia.  We did approximately 40 holes in the posterior calcaneus on the Achilles tendon doing the same approach with the Topaz.    Prior to surgery able to harvest 120 cc of blood through a venipuncture take this blood and spit it down and able to collect approximately 30 cc of PRP.  We injected the PRP along the posterior tibial tendon, peroneal tendon, Achilles tendon proximally, plantar fascia and some in the ankle joint.    The tourniquet was let down bleeding was well-controlled a sterile dressing was applied and a posterior splint at 90  degrees was applied.  Patient tolerated procedure anesthesia well.  Vital signs stable neurovascular intact.  Patient be discharged to PACU then home with written and verbal instructions.  Should the patient have any problems she will call she will follow-up in the office in 5 days.  Patient will be on a full-strength aspirin  Complications:  None; patient tolerated the procedure well.    Disposition: PACU - hemodynamically stable.  Condition: stable         Additional Details: No additional detail    Attending Attestation:     Prakash Otero  Phone Number: [phone]

## 2024-05-03 NOTE — DISCHARGE INSTRUCTIONS
Keep dressing clean, dry, intact.  Elevate, ice behind the knee.  Wiggle toes.  Take pain medicine as directed.  If able to take baby aspirin  Daily.  Follow-up with Dr. Saenz within 1 week.  Please call to make an appointment with [facility] orthopedics.  Keep dressing intact until seen by Dr. Neumann in 1 week.  Pain meds and antibiotics have been sent to your pharmacy prior to surgery today.    The anesthetics, sedatives or narcotics which were given to you today will be acting in your body for the next 24 hours, so you might feel a little sleepy or groggy. This feeling should slowly wear off. Carefully read and follow the instructions.     You received sedation today:  - Do not drive or operate any machinery or power tools of any kind.   - No alcoholic beverages today, not even beer or wine.  - Do not make any important decisions or sign any legal documents.  - No over the counter medications that contain alcohol or that may cause drowsiness.    Call your doctor's office to be advised whether a visit to your nearest Urgent Care or Emergency Department is indicated if you experience any of the following. Take this paper with you if you go.     - If you develop an allergic reaction to the medications that were given during your procedure such as difficulty breathing, rash, hives, severe nausea, vomiting or lightheadedness.  - If you experience chest pain, shortness of breath, fevers and chills.  -If you develop signs and symptoms of excessive bleeding  - If you have not urinated within 8 hours following your procedure.  - If your IV site becomes painful, red, inflamed, or looks infected.    Nurse Signature Date___________________   Patient/Responsible Party Signature Date___________________

## 2024-05-03 NOTE — H&P
History Of Present Illness  Richard Contreras is a 48 y.o. female presenting with painful right foot and ankle for which she is seeing Dr. Saenz in outpatient setting for several months in duration.  Patient is feeling conservative efforts.  When she is in a cam boot she does really well.  When she is out of her boot she struggles.  She has been seen since 2023 and has continued to struggle despite conservative efforts.  She performed an MRI concerning for post medial osteochondral dome defect peroneal inflammation swelling and plantar fasciitis as well as inflamed posterior tibial tendon she is requesting surgical invention for the problem at this time.  Patient understands the risk and benefits involved with surgical intervention.     Past Medical History  Past Medical History:   Diagnosis Date    Achilles tendinitis, right leg     Class 2 obesity with body mass index (BMI) of 39.0 to 39.9 in adult     Joint disorder, unspecified     Hip problem    Migraines     Osteopenia     Personal history of (healed) traumatic fracture 2022    History of fracture    Pure hypercholesterolemia, unspecified 2013    Low-density-lipoid-type (LDL) hyperlipoproteinemia    Ulcerative colitis        Surgical History  Past Surgical History:   Procedure Laterality Date    ANKLE SURGERY       SECTION, LOW TRANSVERSE      GALLBLADDER SURGERY      LAPAROSCOPIC HYSTERECTOMY      MASTECTOMY MODIFIED RADICAL / SIMPLE / COMPLETE      TOTAL HIP ARTHROPLASTY          Social History  She reports that she has never smoked. She has never used smokeless tobacco. She reports that she does not currently use alcohol. She reports that she does not use drugs.    Family History  Family History   Problem Relation Name Age of Onset    Hyperlipidemia Mother      Hypertension Mother      Breast cancer Mother      Hyperlipidemia Father      Hypertension Father      Hyperlipidemia Sister      Breast cancer Sister      Breast  cancer Maternal Grandmother      Colon cancer Maternal Grandmother      Ovarian cancer Maternal Grandmother      Colon cancer Paternal Grandfather      Colon cancer Other maternal uncle         Allergies  Cephalexin, Influenza virus vaccines, Ketorolac, Meperidine (pf), and Nsaids (non-steroidal anti-inflammatory drug)    Review of Systems  As per HPI  Physical Exam     Last Recorded Vitals  There were no vitals taken for this visit.    Relevant Results        Patient neurovascularly alert and orient x 3, cranial nerves intact, HEENT exam normocephalic atraumatic.  Eyes PERRLA, EOMI, dentition is fair, mucosas moist, no JVD or bruits no masses of the neck.  Hearing is intact, musculoskeletal exam is stable and intact to the upper extremity.  Lungs clear to auscultation, heart is steady, abdomen benign, no palpable lymph nodes, dermatologically to the upper extremities she is benign.  Neck and spine is stable.    Left foot has no pain on palpation good range of motion no ecchymosis no edema good strength present.  Patient has no apparent limb length discrepancy.  She does have antalgic gait.  No back pain.  Generalized discomfort to the right lower extremity    Right lower EXTR examination shows neurovascularly intact patient has slight gastroc-soleus equinus.  Excessive subtalar joint eversion.  No varicosities.  Discomfort along the posterior tibial tendon and peroneal tendons.  Patient is negative Tinel across the cervical sural nerve distribution however subjectively complains of burning and tingling around this region.  No open wounds or skin lesions.  Posterior aspect of the right heel is a posterior bump.  No palpable dell across the Achilles.  Patient has good dorsiflexion.  Pain along the retrofibular and infra fibular regions.  Pain in the retrocalcaneal bursal region.  Patient has pain to the medial calcaneal tubercle and the medial band of the plantar fascia.  No subluxing peroneal's.  Mild edema noted to  the lateral ankle and the peroneal tendon sheath.  There is guarding with inversion.  Patient has pain with ankle joint range of motion she feels that she is inside the ant ankle medially.  With guarding and inversion eversion.     Assessment/Plan   Active Problems:  There are no active Hospital Problems.      Impression patient has an autoimmune condition complicated with ankle injury and osteochondral defect of the right ankle.  There is competent surgery plantar fasciitis and Achilles insertional calcific tendinosis and posterior tibial sinus peroneal tendinitis.  At this point this has been ongoing for the past 5 to 6 months she cannot live like this.  Patient has requesting surgical intervention for the problem this time.  Surgical risk and benefits were discussed at length prior to her procedure today which would include bleeding pain infection nausea vomiting, need for further surgery, and anesthesia complications with her not limited to but including death.  We will perform right ankle stress films arthroscopic debridement with microfracture of the ankle and peroneus longus brace brevis tendon synovectomy and debridement repairs.  Would also perform right Achilles tendon debridement and repair as needed.  We also apply utilizing Topaz procedure, PRP injection as needed.           Austen Cain DPM

## 2024-05-03 NOTE — ANESTHESIA PREPROCEDURE EVALUATION
Patient: Richard Contreras    Procedure Information       Anesthesia Start Date/Time: 05/03/24 1421    Procedure: RIGHT ANKLE ARTHROSCOPIC JOINT DEBRIDEMENT WITH MICROFRACTURE (Right) - RIGHT ANKLE ARTHROSCOPIC JOINT DEBRIDEMENT WITH [id number]    Location: [facility] OR 01 / [facility]    Surgeons: Prakash Otero DPM            Relevant Problems   Cardiac   (+) Benign essential hypertension   (+) Chest pain   (+) Hyperlipidemia      Neuro   (+) Anxiety      GI   (+) GERD (gastroesophageal reflux disease)   (+) Ulcerative colitis (Multi)      /Renal   (+) Acute UTI      Liver   (+) Elevated liver function tests      Endocrine   (+) Class 2 severe obesity with serious comorbidity and body mass index (BMI) of 39.0 to 39.9 in adult (Multi)   (+) Diabetes mellitus type 2, uncomplicated (Multi)      ID   (+) Acute UTI       Clinical information reviewed:    Allergies  Meds   Med Hx             NPO Detail:  NPO/Void Status  Date of Last Liquid: 05/03/24  Time of Last Liquid: 0700  Date of Last Solid: 05/02/24  Time of Last Solid: 2300         Physical Exam    Airway  Mallampati: II  TM distance: >3 FB  Neck ROM: full     Cardiovascular - normal exam     Dental - normal exam     Pulmonary - normal exam     Abdominal        Anesthesia Plan    History of general anesthesia?: no  History of complications of general anesthesia?: no    ASA 3     general     The patient is not a current smoker.  Patient was not previously instructed to abstain from smoking on day of procedure.  Patient did not smoke on day of procedure.    intravenous induction   Postoperative administration of opioids is intended.  Trial extubation is planned.  Anesthetic plan and risks discussed with patient.    Plan discussed with attending and CRNA.

## 2024-05-03 NOTE — ANESTHESIA POSTPROCEDURE EVALUATION
Patient: Richard Contreras    Procedure Summary       Date: 05/03/24 Room / Location: [facility] OR 01 / [facility]    Anesthesia Start: 1421 Anesthesia Stop: 1623    Procedure: RIGHT ANKLE ARTHROSCOPIC JOINT DEBRIDEMENT WITH MICROFRACTURE (Right) Diagnosis:       Achilles tendinitis, right leg      (Achilles tendinitis, right leg [M76.61])    Surgeons: Prakash Otero DPM Responsible Provider: Wade Ribera MD    Anesthesia Type: general ASA Status: 3            Anesthesia Type: general    Vitals Value Taken Time   /111 05/03/24 1648   Temp  05/03/24 1657   Pulse 95 05/03/24 1652   Resp  05/03/24 1657   SpO2 98 % 05/03/24 1652   Vitals shown include unfiled device data.    Anesthesia Post Evaluation    Patient location during evaluation: PACU  Patient participation: complete - patient participated  Level of consciousness: awake  Pain score: 2  Pain management: adequate  Multimodal analgesia pain management approach  Airway patency: patent  Two or more strategies used to mitigate risk of obstructive sleep apnea  Cardiovascular status: acceptable  Respiratory status: acceptable  Hydration status: acceptable  Postoperative Nausea and Vomiting: none      No notable events documented.

## 2024-05-03 NOTE — ANESTHESIA PROCEDURE NOTES
Airway  Date/Time: 5/3/2024 2:27 PM  Urgency: elective    Airway not difficult    Staffing  Performed: CRNA   Authorized by: Wade Ribera MD    Performed by: RAYRAY Rodriguez-DEVORA  Patient location during procedure: OR    Indications and Patient Condition  Indications for airway management: anesthesia  Spontaneous ventilation: present  Sedation level: deep  Preoxygenated: yes  Patient position: sniffing  MILS maintained throughout  Mask difficulty assessment: 0 - not attempted    Final Airway Details  Final airway type: supraglottic airway      Successful airway: Supraglottic airway: Igel.  Size 4     Number of attempts at approach: 1  Ventilation between attempts: BVM  Number of other approaches attempted: 0

## 2024-05-03 NOTE — NURSING NOTE
Pt sent home without instructions for post-surgical medications as per Dr Otero Rx was sent by office before Sx and no information concerning home medications appeared in discharge paperwork. Pt was made aware of her last oxycodone administration time in her paperwork.

## 2024-05-07 ENCOUNTER — TELEPHONE (OUTPATIENT)
Dept: PRIMARY CARE | Facility: CLINIC | Age: 49
End: 2024-05-07

## 2024-05-07 NOTE — TELEPHONE ENCOUNTER
Patient had surgery on foot 5/3/24 Patient has not taken medication in last 24 hours and cannot swallow or keep fluids and foods down, even water. She believes her throat swelling is a possible item might be stuck in throat.     She has not eaten or taken medication in 24 hours.    Advise

## 2024-06-18 ENCOUNTER — TELEPHONE (OUTPATIENT)
Dept: PRIMARY CARE | Facility: CLINIC | Age: 49
End: 2024-06-18
Payer: COMMERCIAL

## 2024-06-18 ENCOUNTER — LAB (OUTPATIENT)
Dept: LAB | Facility: LAB | Age: 49
End: 2024-06-18
Payer: COMMERCIAL

## 2024-06-18 ENCOUNTER — TELEMEDICINE (OUTPATIENT)
Dept: PRIMARY CARE | Facility: CLINIC | Age: 49
End: 2024-06-18
Payer: COMMERCIAL

## 2024-06-18 DIAGNOSIS — Z00.00 ENCOUNTER FOR ANNUAL PHYSICAL EXAM: ICD-10-CM

## 2024-06-18 DIAGNOSIS — N30.00 ACUTE CYSTITIS WITHOUT HEMATURIA: ICD-10-CM

## 2024-06-18 DIAGNOSIS — N30.00 ACUTE CYSTITIS WITHOUT HEMATURIA: Primary | ICD-10-CM

## 2024-06-18 DIAGNOSIS — B37.31 YEAST VAGINITIS: ICD-10-CM

## 2024-06-18 DIAGNOSIS — E11.9 TYPE 2 DIABETES MELLITUS WITHOUT COMPLICATION, WITHOUT LONG-TERM CURRENT USE OF INSULIN (MULTI): ICD-10-CM

## 2024-06-18 LAB
APPEARANCE UR: ABNORMAL
BACTERIA #/AREA URNS AUTO: ABNORMAL /HPF
BILIRUB UR STRIP.AUTO-MCNC: NEGATIVE MG/DL
COLOR UR: ABNORMAL
GLUCOSE UR STRIP.AUTO-MCNC: ABNORMAL MG/DL
KETONES UR STRIP.AUTO-MCNC: NEGATIVE MG/DL
LEUKOCYTE ESTERASE UR QL STRIP.AUTO: ABNORMAL
MUCOUS THREADS #/AREA URNS AUTO: ABNORMAL /LPF
NITRITE UR QL STRIP.AUTO: ABNORMAL
PH UR STRIP.AUTO: 6 [PH]
PROT UR STRIP.AUTO-MCNC: NEGATIVE MG/DL
RBC # UR STRIP.AUTO: NEGATIVE /UL
RBC #/AREA URNS AUTO: ABNORMAL /HPF
SP GR UR STRIP.AUTO: 1.04
SQUAMOUS #/AREA URNS AUTO: ABNORMAL /HPF
UROBILINOGEN UR STRIP.AUTO-MCNC: NORMAL MG/DL
WBC #/AREA URNS AUTO: ABNORMAL /HPF
YEAST BUDDING #/AREA UR COMP ASSIST: PRESENT /HPF

## 2024-06-18 PROCEDURE — 87086 URINE CULTURE/COLONY COUNT: CPT

## 2024-06-18 PROCEDURE — 99214 OFFICE O/P EST MOD 30 MIN: CPT | Performed by: FAMILY MEDICINE

## 2024-06-18 PROCEDURE — 3051F HG A1C>EQUAL 7.0%<8.0%: CPT | Performed by: FAMILY MEDICINE

## 2024-06-18 PROCEDURE — 87186 SC STD MICRODIL/AGAR DIL: CPT

## 2024-06-18 PROCEDURE — 3048F LDL-C <100 MG/DL: CPT | Performed by: FAMILY MEDICINE

## 2024-06-18 PROCEDURE — 81001 URINALYSIS AUTO W/SCOPE: CPT

## 2024-06-18 RX ORDER — NITROFURANTOIN 25; 75 MG/1; MG/1
100 CAPSULE ORAL 2 TIMES DAILY
Qty: 10 CAPSULE | Refills: 0 | Status: SHIPPED | OUTPATIENT
Start: 2024-06-18 | End: 2024-06-23

## 2024-06-18 RX ORDER — FLUCONAZOLE 150 MG/1
150 TABLET ORAL ONCE
Qty: 2 TABLET | Refills: 0 | Status: SHIPPED | OUTPATIENT
Start: 2024-06-18 | End: 2024-06-18

## 2024-06-18 NOTE — TELEPHONE ENCOUNTER
Spoke w/ patient set for virtual visit today at 11:45 am, bladder issue also medication refill, AM

## 2024-06-18 NOTE — TELEPHONE ENCOUNTER
Patient called in stating she feels she may have a bladder or kidney infection . Onset of cramping , bowel movements, and frequent urination started on Sunday 6/16/24 . Patient also stated she recently had an ankle surgery, and did complete abx. She Is also traveling out of the country this weekend , and wanted to know what BB recc. To try and get ahead of the situation. Lastly, she stated she has had sensations to go but then dont and had an accident to where she urinated in the bed . Advise ?

## 2024-06-18 NOTE — PROGRESS NOTES
Subjective   Richard Contreras is a 48 y.o. female who presents for No chief complaint on file..  HPI    Recent ankle reconstruction Sx. Nonwt bearing for 4 wk, now w walking boot    3 d ago woke up w diarrhea. Then started w urinary freq, urgency, abnml ordor, one episode incont, some itching. B/l flank pain. No fever or vomiting.     Took diflucan once already     An interactive audio and video telecommunication system which permits real time communications between the patient  (at the originating site) and provider (at the distant site) was utilized to provide this telehealth service. Verbal consent was requested and obtained today for a telehealth visit.  Current Outpatient Medications on File Prior to Visit   Medication Sig Dispense Refill    acetaminophen (Tylenol) 325 mg tablet Take 1 tablet (325 mg) by mouth if needed.      ascorbic acid (Vitamin C) 500 mg tablet Take by mouth twice a day.      atorvastatin (Lipitor) 40 mg tablet Take 1 tablet (40 mg) by mouth once daily at bedtime. 90 tablet 3    benzonatate (Tessalon) 100 mg capsule Take 1 capsule (100 mg) by mouth 3 times a day as needed.      buPROPion XL (Wellbutrin XL) 150 mg 24 hr tablet Take 1 tablet (150 mg) by mouth once daily. 90 tablet 3    DULoxetine (Cymbalta) 30 mg DR capsule Take 1 capsule (30 mg) by mouth once daily.      estradiol (Estrace) 0.5 mg tablet Take 1 tablet (0.5 mg) by mouth once daily. 90 tablet 3    ezetimibe (Zetia) 10 mg tablet Take 1 tablet (10 mg) by mouth once daily at bedtime. 90 tablet 3    FLUoxetine (PROzac) 10 mg capsule Take 1 capsule (10 mg) by mouth once daily. 90 capsule 3    gabapentin (Gralise) 300 mg tablet extended release 24 hr Take 1 tablet (300 mg) by mouth once daily.      gabapentin (Gralise) 600 mg tablet extended release 24 hr Take 600 mg by mouth 1 (one) time each day.      hydrOXYzine HCL (Atarax) 10 mg tablet Take 1-2 tablets (10-20 mg) by mouth 2 times a day as needed for anxiety. 180 tablet 1     hyoscyamine (Levsin) 0.125 mg tablet Take 1 tablet (0.125 mg) by mouth every 4 hours if needed for cramping for up to 10 days. 30 tablet 2    mesalamine (Asacol) 800 mg EC tablet Take 2 tablets (1,600 mg) by mouth 2 times a day. Take 2 tablets by mouth once daily 360 tablet 1    naratriptan (Amerge) 2.5 mg tablet Take 1 tablet (2.5 mg) by mouth 1 time if needed.      Nurtec ODT 75 mg tablet,disintegrating DISSOLVE ONE TABLET IN MOUTH once DAILY as needed for migraines. 8 tablet 3    omeprazole (PriLOSEC) 40 mg DR capsule Take 1 capsule (40 mg) by mouth once daily in the morning. Take before meals. Do not crush or chew. 90 capsule 3    ondansetron (Zofran) 4 mg tablet Take 1 tablet (4 mg) by mouth every 6 hours if needed for nausea.      rizatriptan (Maxalt) 10 mg tablet Take by mouth. TAKE 1 TABLET BY MOUTH AS NEEDED FOR MIGRAINE HEADACHE (SEE ADMINISTRATION INSTRUCTIONS). MAY REPEAT IN 2 HOURS IF NEEDED.      triamterene-hydrochlorothiazid (Dyazide) 37.5-25 mg capsule Take 1 capsule by mouth once daily. 90 capsule 3    [DISCONTINUED] empagliflozin (Jardiance) 25 mg Take 1 tablet (25 mg) by mouth once daily. 90 tablet 3     No current facility-administered medications on file prior to visit.                  Objective   There were no vitals taken for this visit.   Physical Exam  General: NAD  HEENT:NCAT  Lungs: no audible wheeze or rhonchi   Skin: no rashes  Psych: cooperative, appropriate affect  Neuro: speech clear. A&Ox3    Assessment/Plan   Problem List Items Addressed This Visit       Diabetes mellitus type 2, uncomplicated (Multi)  RF jardiance but needs PA  Samples provided for pt to  in interim       Relevant Medications    empagliflozin (Jardiance) 25 mg     Other Visit Diagnoses       Acute cystitis without hematuria    -  Primary  Check UA and Cx  Macrobid BID x 5 d   No systemic signs of pyelo   Diflucan in case of yeast   F/up July as ron for CPE labs prior     Relevant Medications     nitrofurantoin, macrocrystal-monohydrate, (Macrobid) 100 mg capsule    Other Relevant Orders    Urinalysis with Reflex Culture and Microscopic    Yeast vaginitis        Relevant Medications    fluconazole (Diflucan) 150 mg tablet

## 2024-06-19 LAB — HOLD SPECIMEN: NORMAL

## 2024-06-20 LAB — BACTERIA UR CULT: ABNORMAL

## 2024-07-09 ENCOUNTER — APPOINTMENT (OUTPATIENT)
Dept: PRIMARY CARE | Facility: CLINIC | Age: 49
End: 2024-07-09
Payer: COMMERCIAL

## 2024-07-30 DIAGNOSIS — K51.919 ULCERATIVE COLITIS WITH COMPLICATION, UNSPECIFIED LOCATION (MULTI): ICD-10-CM

## 2024-07-30 RX ORDER — MESALAMINE 800 MG/1
1600 TABLET, DELAYED RELEASE ORAL 2 TIMES DAILY
Qty: 360 TABLET | Refills: 0 | Status: SHIPPED | OUTPATIENT
Start: 2024-07-30

## 2024-09-11 ENCOUNTER — LAB (OUTPATIENT)
Dept: LAB | Facility: LAB | Age: 49
End: 2024-09-11
Payer: COMMERCIAL

## 2024-09-11 DIAGNOSIS — N39.0 ACUTE UTI: ICD-10-CM

## 2024-09-11 LAB
APPEARANCE UR: CLEAR
BILIRUB UR STRIP.AUTO-MCNC: NEGATIVE MG/DL
COLOR UR: ABNORMAL
GLUCOSE UR STRIP.AUTO-MCNC: ABNORMAL MG/DL
KETONES UR STRIP.AUTO-MCNC: ABNORMAL MG/DL
LEUKOCYTE ESTERASE UR QL STRIP.AUTO: NEGATIVE
NITRITE UR QL STRIP.AUTO: NEGATIVE
PH UR STRIP.AUTO: 5.5 [PH]
PROT UR STRIP.AUTO-MCNC: NEGATIVE MG/DL
RBC # UR STRIP.AUTO: NEGATIVE /UL
SP GR UR STRIP.AUTO: 1.03
UROBILINOGEN UR STRIP.AUTO-MCNC: NORMAL MG/DL

## 2024-09-11 PROCEDURE — 81003 URINALYSIS AUTO W/O SCOPE: CPT

## 2024-09-12 ENCOUNTER — TELEPHONE (OUTPATIENT)
Dept: PRIMARY CARE | Facility: CLINIC | Age: 49
End: 2024-09-12
Payer: COMMERCIAL

## 2024-09-12 DIAGNOSIS — B37.31 YEAST VAGINITIS: Primary | ICD-10-CM

## 2024-09-12 LAB — HOLD SPECIMEN: NORMAL

## 2024-09-12 RX ORDER — FLUCONAZOLE 150 MG/1
150 TABLET ORAL ONCE
Qty: 2 TABLET | Refills: 0 | Status: SHIPPED | OUTPATIENT
Start: 2024-09-12 | End: 2024-09-12

## 2024-09-12 NOTE — TELEPHONE ENCOUNTER
Urine test did not show signs of inf  Therefore, does not need abx    She has sugar in urine from jardiance    Ketones present which can indicate dehyd or too high BS. Has her sugar been high lately? Try to push fluids     She may have a yeast infection instead of UTI. I sent in diflucan

## 2024-10-01 ENCOUNTER — APPOINTMENT (OUTPATIENT)
Dept: PRIMARY CARE | Facility: CLINIC | Age: 49
End: 2024-10-01
Payer: COMMERCIAL

## 2024-10-01 VITALS
BODY MASS INDEX: 39.65 KG/M2 | RESPIRATION RATE: 16 BRPM | HEIGHT: 61 IN | HEART RATE: 92 BPM | OXYGEN SATURATION: 95 % | SYSTOLIC BLOOD PRESSURE: 130 MMHG | WEIGHT: 210 LBS | DIASTOLIC BLOOD PRESSURE: 86 MMHG | TEMPERATURE: 97 F

## 2024-10-01 DIAGNOSIS — Z00.00 ENCOUNTER FOR ANNUAL PHYSICAL EXAM: Primary | ICD-10-CM

## 2024-10-01 DIAGNOSIS — G89.29 CHRONIC NONINTRACTABLE HEADACHE, UNSPECIFIED HEADACHE TYPE: ICD-10-CM

## 2024-10-01 DIAGNOSIS — R51.9 CHRONIC NONINTRACTABLE HEADACHE, UNSPECIFIED HEADACHE TYPE: ICD-10-CM

## 2024-10-01 DIAGNOSIS — N39.0 ACUTE UTI: ICD-10-CM

## 2024-10-01 DIAGNOSIS — B37.31 YEAST VAGINITIS: ICD-10-CM

## 2024-10-01 DIAGNOSIS — E11.9 TYPE 2 DIABETES MELLITUS WITHOUT COMPLICATION, WITHOUT LONG-TERM CURRENT USE OF INSULIN (MULTI): ICD-10-CM

## 2024-10-01 PROCEDURE — 3051F HG A1C>EQUAL 7.0%<8.0%: CPT | Performed by: FAMILY MEDICINE

## 2024-10-01 PROCEDURE — 1036F TOBACCO NON-USER: CPT | Performed by: FAMILY MEDICINE

## 2024-10-01 PROCEDURE — 3079F DIAST BP 80-89 MM HG: CPT | Performed by: FAMILY MEDICINE

## 2024-10-01 PROCEDURE — 99213 OFFICE O/P EST LOW 20 MIN: CPT | Performed by: FAMILY MEDICINE

## 2024-10-01 PROCEDURE — 3075F SYST BP GE 130 - 139MM HG: CPT | Performed by: FAMILY MEDICINE

## 2024-10-01 PROCEDURE — 3008F BODY MASS INDEX DOCD: CPT | Performed by: FAMILY MEDICINE

## 2024-10-01 PROCEDURE — 99396 PREV VISIT EST AGE 40-64: CPT | Performed by: FAMILY MEDICINE

## 2024-10-01 PROCEDURE — 3048F LDL-C <100 MG/DL: CPT | Performed by: FAMILY MEDICINE

## 2024-10-01 RX ORDER — GABAPENTIN 300 MG/1
600 CAPSULE ORAL 3 TIMES DAILY
Qty: 180 CAPSULE | Refills: 11 | Status: SHIPPED | OUTPATIENT
Start: 2024-10-01 | End: 2025-10-01

## 2024-10-01 RX ORDER — FLUCONAZOLE 150 MG/1
150 TABLET ORAL ONCE
Qty: 1 TABLET | Refills: 0 | Status: SHIPPED | OUTPATIENT
Start: 2024-10-01 | End: 2024-10-01

## 2024-10-01 RX ORDER — GABAPENTIN 300 MG/1
300 CAPSULE ORAL 3 TIMES DAILY
COMMUNITY
End: 2024-10-01 | Stop reason: ALTCHOICE

## 2024-10-01 RX ORDER — NITROFURANTOIN 25; 75 MG/1; MG/1
100 CAPSULE ORAL 2 TIMES DAILY
Qty: 14 CAPSULE | Refills: 0 | Status: SHIPPED | OUTPATIENT
Start: 2024-10-01 | End: 2024-10-08

## 2024-10-01 NOTE — PROGRESS NOTES
Subjective   Richard Contreras is a 48 y.o. female who presents for Annual Exam.  HPI  PMH:  BRACA1 prophylatic mastectomy/oophorectomy/MOMO 2007  Acute renal failure-hosp in [facility] d/t toradol   B/l hip replacements   DM2   -Dr Bennett   Colon polyps c-scope 3/2023  Long COVID migraines-[facility]   NICOLETTE-prozac added to wellbutrin 2022   HTN   HLD  Migraines-alt triptans/nurtec   Osteopenia 11/2022 T 1.7 spine  Pap NIL neg HPV 5/2021  Ankle reconstruction 4/2024   NO FLU dt ALLERGY     Here for CPE    R ankle surg/reconstruction in may.     Enjoying empty nest and traveling more.     Derm UTD     Plans to start playing pickleball more.     Ins not covering gralise and sukhi ineff     Jardiance can cause yeast inf vs UTI   Current Outpatient Medications on File Prior to Visit   Medication Sig Dispense Refill    acetaminophen (Tylenol) 325 mg tablet Take 1 tablet (325 mg) by mouth if needed.      atorvastatin (Lipitor) 40 mg tablet Take 1 tablet (40 mg) by mouth once daily at bedtime. 90 tablet 3    benzonatate (Tessalon) 100 mg capsule Take 1 capsule (100 mg) by mouth 3 times a day as needed.      buPROPion XL (Wellbutrin XL) 150 mg 24 hr tablet Take 1 tablet (150 mg) by mouth once daily. 90 tablet 3    empagliflozin (Jardiance) 25 mg Take 1 tablet (25 mg) by mouth once daily. 90 tablet 3    estradiol (Estrace) 0.5 mg tablet Take 1 tablet (0.5 mg) by mouth once daily. 90 tablet 3    ezetimibe (Zetia) 10 mg tablet Take 1 tablet (10 mg) by mouth once daily at bedtime. 90 tablet 3    FLUoxetine (PROzac) 10 mg capsule Take 1 capsule (10 mg) by mouth once daily. 90 capsule 3    hydrOXYzine HCL (Atarax) 10 mg tablet Take 1-2 tablets (10-20 mg) by mouth 2 times a day as needed for anxiety. 180 tablet 1    hyoscyamine (Levsin) 0.125 mg tablet Take 1 tablet (0.125 mg) by mouth every 4 hours if needed for cramping for up to 10 days. 30 tablet 2    mesalamine (Asacol) 800 mg EC tablet Take 2 tablets (1,600 mg) by mouth 2 times  "a day. 360 tablet 0    naratriptan (Amerge) 2.5 mg tablet Take 1 tablet (2.5 mg) by mouth 1 time if needed.      Nurtec ODT 75 mg tablet,disintegrating DISSOLVE ONE TABLET IN MOUTH once DAILY as needed for migraines. 8 tablet 3    omeprazole (PriLOSEC) 40 mg DR capsule Take 1 capsule (40 mg) by mouth once daily in the morning. Take before meals. Do not crush or chew. 90 capsule 3    ondansetron (Zofran) 4 mg tablet Take 1 tablet (4 mg) by mouth every 6 hours if needed for nausea.      rizatriptan (Maxalt) 10 mg tablet Take by mouth. TAKE 1 TABLET BY MOUTH AS NEEDED FOR MIGRAINE HEADACHE (SEE ADMINISTRATION INSTRUCTIONS). MAY REPEAT IN 2 HOURS IF NEEDED.      triamterene-hydrochlorothiazid (Dyazide) 37.5-25 mg capsule Take 1 capsule by mouth once daily. 90 capsule 3    [DISCONTINUED] gabapentin (Neurontin) 300 mg capsule Take 1 capsule (300 mg) by mouth 3 times a day.      ascorbic acid (Vitamin C) 500 mg tablet Take by mouth twice a day.      DULoxetine (Cymbalta) 30 mg DR capsule Take 1 capsule (30 mg) by mouth once daily.      [DISCONTINUED] gabapentin (Gralise) 300 mg tablet extended release 24 hr Take 1 tablet (300 mg) by mouth once daily.      [DISCONTINUED] gabapentin (Gralise) 600 mg tablet extended release 24 hr Take 600 mg by mouth 1 (one) time each day.       No current facility-administered medications on file prior to visit.                  Objective   /86 (BP Location: Left arm)   Pulse 92   Temp 36.1 °C (97 °F)   Resp 16   Ht 1.549 m (5' 1\")   Wt 95.3 kg (210 lb)   SpO2 95%   BMI 39.68 kg/m²    Physical Exam  General: NAD  HEENT:NCAT, PERRLA, nml OP, b/l TM clear   Neck: no cervical CAITLYN  Heart: RRR no murmur, no edema   Lungs: CTA b/l, no wheeze or rhonchi   GI: abd soft, nontender, nondistended.   MSK: no c/c/e. nml gait   Skin: warm and dry  Psych: cooperative, appropriate affect  Neuro: speech clear. A&Ox3  Assessment/Plan   Problem List Items Addressed This Visit       Diabetes " mellitus type 2, uncomplicated (Multi)  Check labs when fasting   Cont jardiance     Relevant Orders    CBC and Auto Differential    Comprehensive Metabolic Panel    Hemoglobin A1C    Lipid Panel    Acute UTI  Sent in macrobid to have on hand w freq UTI and standing order urine Cx     Relevant Medications    nitrofurantoin, macrocrystal-monohydrate, (Macrobid) 100 mg capsule    Other Relevant Orders    Urinalysis with Reflex Culture and Microscopic     Other Visit Diagnoses       Encounter for annual physical exam    -  Primary  CPE:overall doing well. Cont balanced diet/reg ex   BMI: 39  VACC: reviewed declines tdap. No flu/covid d/t allergy   COLON CA SCRN: UTD  LABS: ordered  PAP: UTD  MAMMO: na d/t mastectomy   DEXA: UTD   RTC yearly or prn     Chronic nonintractable headache, unspecified headache type      HA worse since ins denied gralise   Taking more triptan/nurtec   INCREASE sukhi 300 TID to up to 600 TID prn   F/up neuro if no improvement     Relevant Medications    gabapentin (Neurontin) 300 mg capsule    Yeast vaginitis      Freq d/t SGLT2  Sent in prn diflucan       Relevant Medications    fluconazole (Diflucan) 150 mg tablet

## 2024-10-25 DIAGNOSIS — Z91.018 ALLERGY TO MANGO FRUIT: ICD-10-CM

## 2024-10-25 RX ORDER — EPINEPHRINE 0.3 MG/.3ML
INJECTION SUBCUTANEOUS
Qty: 1 EACH | Refills: 1 | Status: SHIPPED | OUTPATIENT
Start: 2024-10-25 | End: 2025-10-25

## 2024-11-01 DIAGNOSIS — K51.919 ULCERATIVE COLITIS WITH COMPLICATION, UNSPECIFIED LOCATION (MULTI): ICD-10-CM

## 2024-11-04 RX ORDER — MESALAMINE 800 MG/1
TABLET, DELAYED RELEASE ORAL
Qty: 360 TABLET | Refills: 0 | Status: SHIPPED | OUTPATIENT
Start: 2024-11-04

## 2024-11-08 RX ORDER — TRAMADOL HYDROCHLORIDE 50 MG/1
1 TABLET ORAL
COMMUNITY
Start: 2024-10-22

## 2024-11-08 RX ORDER — OXYCODONE AND ACETAMINOPHEN 5; 325 MG/1; MG/1
1 TABLET ORAL EVERY 6 HOURS PRN
COMMUNITY
Start: 2024-06-07

## 2024-11-11 ENCOUNTER — APPOINTMENT (OUTPATIENT)
Dept: GASTROENTEROLOGY | Facility: CLINIC | Age: 49
End: 2024-11-11
Payer: COMMERCIAL

## 2024-11-11 VITALS — WEIGHT: 213 LBS | HEIGHT: 61 IN | HEART RATE: 90 BPM | BODY MASS INDEX: 40.22 KG/M2

## 2024-11-11 DIAGNOSIS — K51.00 ULCERATIVE PANCOLITIS WITHOUT COMPLICATION (MULTI): Primary | ICD-10-CM

## 2024-11-11 DIAGNOSIS — K21.9 GASTROESOPHAGEAL REFLUX DISEASE WITHOUT ESOPHAGITIS: ICD-10-CM

## 2024-11-11 DIAGNOSIS — K51.919 ULCERATIVE COLITIS WITH COMPLICATION, UNSPECIFIED LOCATION (MULTI): ICD-10-CM

## 2024-11-11 DIAGNOSIS — K21.9 GASTROESOPHAGEAL REFLUX DISEASE, UNSPECIFIED WHETHER ESOPHAGITIS PRESENT: ICD-10-CM

## 2024-11-11 PROCEDURE — 3048F LDL-C <100 MG/DL: CPT | Performed by: INTERNAL MEDICINE

## 2024-11-11 PROCEDURE — 3008F BODY MASS INDEX DOCD: CPT | Performed by: INTERNAL MEDICINE

## 2024-11-11 PROCEDURE — 3051F HG A1C>EQUAL 7.0%<8.0%: CPT | Performed by: INTERNAL MEDICINE

## 2024-11-11 PROCEDURE — 1036F TOBACCO NON-USER: CPT | Performed by: INTERNAL MEDICINE

## 2024-11-11 PROCEDURE — 99214 OFFICE O/P EST MOD 30 MIN: CPT | Performed by: INTERNAL MEDICINE

## 2024-11-11 RX ORDER — MESALAMINE 800 MG/1
800 TABLET, DELAYED RELEASE ORAL 2 TIMES DAILY
Qty: 120 TABLET | Refills: 11 | Status: SHIPPED | OUTPATIENT
Start: 2024-11-11

## 2024-11-11 RX ORDER — OMEPRAZOLE 40 MG/1
40 CAPSULE, DELAYED RELEASE ORAL
Qty: 90 CAPSULE | Refills: 3 | Status: SHIPPED | OUTPATIENT
Start: 2024-11-11

## 2024-11-11 ASSESSMENT — ENCOUNTER SYMPTOMS
HEMATOLOGIC/LYMPHATIC NEGATIVE: 1
PSYCHIATRIC NEGATIVE: 1
EYES NEGATIVE: 1
BACK PAIN: 1
ENDOCRINE NEGATIVE: 1
CARDIOVASCULAR NEGATIVE: 1
ALLERGIC/IMMUNOLOGIC NEGATIVE: 1
GASTROINTESTINAL NEGATIVE: 1
NEUROLOGICAL NEGATIVE: 1
CONSTITUTIONAL NEGATIVE: 1
RESPIRATORY NEGATIVE: 1

## 2024-11-11 NOTE — PROGRESS NOTES
Ulcerative colitis  Gastroesophageal reflux    History of Present Illness:   Richard Contreras is a 49 y.o. female with PMHx of obesity, migraines, hypertension, ulcerative colitis for greater than 20 years, acid reflux, and currently with sciatica and who presents to GI clinic for follow up.  Last seen around 2023 for endoscopy and colonoscopy.  Endoscopy showed some fundic gland polyps and no other significant esophageal abnormality.  Colonoscopy showed a colon in remission.  A single polyp was present.  She denies rectal bleeding, fever, abdominal pain or diarrhea.  Her main complaint is orthopedic with symptoms down the left leg which sound most consistent with sciatica.  She is taking Aleve as needed.  She has no other major complaints.    Her last colonoscopy and pathology is reviewed    Her medications are reviewed    Review of Systems  Review of Systems   Constitutional: Negative.    HENT: Negative.     Eyes: Negative.    Respiratory: Negative.     Cardiovascular: Negative.    Gastrointestinal: Negative.    Endocrine: Negative.    Genitourinary: Negative.    Musculoskeletal:  Positive for back pain.   Skin: Negative.    Allergic/Immunologic: Negative.    Neurological: Negative.    Hematological: Negative.    Psychiatric/Behavioral: Negative.     All other systems reviewed and are negative.      Allergies  Allergies   Allergen Reactions    Aspirin Unknown    Cephalexin Unknown    Influenza Virus Vaccines Swelling     CAUSED  FLUID AROUND HEART    Ketorolac Unknown    Meperidine (Pf) Itching    Nsaids (Non-Steroidal Anti-Inflammatory Drug) Unknown       Medications  Current Outpatient Medications   Medication Instructions    acetaminophen (TYLENOL) 325 mg, As needed    atorvastatin (LIPITOR) 40 mg, oral, Nightly    benzonatate (TESSALON) 100 mg, 3 times daily PRN    buPROPion XL (WELLBUTRIN XL) 150 mg, oral, Daily    DULoxetine (CYMBALTA) 30 mg, Daily    empagliflozin (JARDIANCE) 25 mg, oral, Daily     EPINEPHrine 0.3 mg/0.3 mL injection syringe INJECT 0.3 ML (0.3 MG) INTO THE MUSCLE 1 TIME IF NEEDED FOR ANAPHYLAXIS. INJECT INTO THE UPPER LEG. CALL 911 AFTER USE.    estradiol (ESTRACE) 0.5 mg, oral, Daily    ezetimibe (ZETIA) 10 mg, oral, Nightly    FLUoxetine (PROZAC) 10 mg, oral, Daily    gabapentin (NEURONTIN) 600 mg, oral, 3 times daily    HYDROcodone-acetaminophen (Norco) 5-325 mg tablet 1 tablet, oral, 3 times daily PRN    hydrOXYzine HCL (ATARAX) 10-20 mg, oral, 2 times daily PRN    hyoscyamine (LEVSIN) 0.125 mg, oral, Every 4 hours PRN    mesalamine (Asacol) 800 mg EC tablet TAKE TWO TABLETS BY MOUTH TWO TIMES A DAY. DUE TO SEE DOCTOR IN MARCH BEFORE NEXT REFILL    naratriptan (AMERGE) 2.5 mg, Once as needed    Nurtec ODT 75 mg tablet,disintegrating DISSOLVE ONE TABLET IN MOUTH once DAILY as needed for migraines.    omeprazole (PRILOSEC) 40 mg, oral, Daily before breakfast, Do not crush or chew.    ondansetron (ZOFRAN) 4 mg, Every 6 hours PRN    oxyCODONE-acetaminophen (Percocet) 5-325 mg tablet 1 tablet, Every 6 hours PRN    rizatriptan (Maxalt) 10 mg tablet Take by mouth. TAKE 1 TABLET BY MOUTH AS NEEDED FOR MIGRAINE HEADACHE (SEE ADMINISTRATION INSTRUCTIONS). MAY REPEAT IN 2 HOURS IF NEEDED.    traMADol (Ultram) 50 mg tablet 1 tablet, 3 times daily (0900,1400,1900)    triamterene-hydrochlorothiazid (Dyazide) 37.5-25 mg capsule 1 capsule, oral, Daily        Objective   Visit Vitals  Pulse 90      Physical Exam  Constitutional:       Appearance: Normal appearance.   Eyes:      Conjunctiva/sclera: Conjunctivae normal.   Cardiovascular:      Rate and Rhythm: Normal rate and regular rhythm.   Pulmonary:      Effort: Pulmonary effort is normal.      Breath sounds: Normal breath sounds.   Abdominal:      General: Abdomen is flat. Bowel sounds are normal.      Palpations: Abdomen is soft.   Musculoskeletal:         General: Normal range of motion.      Cervical back: Normal range of motion.   Neurological:       Mental Status: She is alert.           Lab Results   Component Value Date    WBC 6.8 03/12/2024    WBC 6.6 01/24/2023    WBC 13.0 (H) 09/22/2020    HGB 14.4 03/12/2024    HGB 14.6 01/24/2023    HGB 12.3 09/22/2020    HCT 47.7 (H) 03/12/2024    HCT 46.1 (H) 01/24/2023    HCT 38.6 09/22/2020     03/12/2024     01/24/2023     09/22/2020     Lab Results   Component Value Date     03/12/2024     01/24/2023     08/16/2021    K 3.9 03/12/2024    K 3.6 01/24/2023    K 4.7 08/16/2021     03/12/2024     01/24/2023     08/16/2021    CO2 24 03/12/2024    CO2 26 01/24/2023    CO2 23 08/16/2021    BUN 15 03/12/2024    BUN 16 01/24/2023    BUN 15 08/16/2021    CREATININE 0.70 03/12/2024    CREATININE 0.81 01/24/2023    CREATININE 0.57 08/16/2021    CALCIUM 9.8 03/12/2024    CALCIUM 9.6 01/24/2023    CALCIUM 9.5 08/16/2021    PROT 7.1 03/12/2024    PROT 7.1 01/24/2023    PROT 6.7 08/16/2021    BILITOT 0.9 03/12/2024    BILITOT 0.9 01/24/2023    BILITOT 0.8 08/16/2021    ALKPHOS 134 (H) 03/12/2024    ALKPHOS 125 (H) 01/24/2023    ALKPHOS 120 (H) 08/16/2021    ALT 28 03/12/2024    ALT 20 01/24/2023    ALT 24 08/16/2021    AST 28 03/12/2024    AST 27 01/24/2023    AST 20 08/16/2021    GLUCOSE 155 (H) 03/12/2024    GLUCOSE 119 (H) 01/24/2023    GLUCOSE 125 (H) 08/16/2021           Richard Contreras is a 49 y.o. female who presents to GI clinic for gastroesophageal reflux and history of ulcerative colitis.  Her medications have been refilled.  I do think it is appropriate if needed to do a Medrol Dosepak for her back.  She will discuss this with her primary doctor as well as try and attempt to get an MRI of her back due to her pain..  I have also given her name of an orthopedic spine specialist if she needs it.    GERD (gastroesophageal reflux disease)  Patient is a 49-year-old with history of chronic acid reflux and obesity who is doing well on omeprazole 40 mg daily.  She  denies any dysphagia, vomiting blood or black stool.  She had endoscopy 2023 which is reviewed.  I have refilled her medication.        Ulcerative colitis (Multi)  Patient is a 49-year-old with a history of ulcerative colitis for greater than 20 years who had colonoscopy and was doing well.  Her colonoscopy was in 2023 showing a small polyp.  She has no complaints on 3.2 g of Asacol daily.  She will continue.  I am asking her to do blood work.  I do think she needs a repeat colonoscopy in no more than 2 years.  I have also discussed other complications of ulcerative colitis including biliary abnormalities, skin problems, arthritis, and I am recommending she get up-to-date with the pneumonia shot and shingles vaccine.  All her questions are answered and she is comfortable with this approach.       Carmine Henderson MD

## 2024-11-11 NOTE — ASSESSMENT & PLAN NOTE
Patient is a 49-year-old with a history of ulcerative colitis for greater than 20 years who had colonoscopy and was doing well.  Her colonoscopy was in 2023 showing a small polyp.  She has no complaints on 3.2 g of Asacol daily.  She will continue.  I am asking her to do blood work.  I do think she needs a repeat colonoscopy in no more than 2 years.  I have also discussed other complications of ulcerative colitis including biliary abnormalities, skin problems, arthritis, and I am recommending she get up-to-date with the pneumonia shot and shingles vaccine.  All her questions are answered and she is comfortable with this approach.

## 2024-11-11 NOTE — ASSESSMENT & PLAN NOTE
Patient is a 49-year-old with history of chronic acid reflux and obesity who is doing well on omeprazole 40 mg daily.  She denies any dysphagia, vomiting blood or black stool.  She had endoscopy 2023 which is reviewed.  I have refilled her medication.

## 2024-11-20 ENCOUNTER — OFFICE VISIT (OUTPATIENT)
Dept: ORTHOPEDIC SURGERY | Facility: CLINIC | Age: 49
End: 2024-11-20
Payer: COMMERCIAL

## 2024-11-20 VITALS — HEIGHT: 61 IN | WEIGHT: 213 LBS | BODY MASS INDEX: 40.22 KG/M2

## 2024-11-20 DIAGNOSIS — M54.16 LUMBAR RADICULOPATHY: Primary | ICD-10-CM

## 2024-11-20 PROCEDURE — 99213 OFFICE O/P EST LOW 20 MIN: CPT | Performed by: PHYSICIAN ASSISTANT

## 2024-11-20 PROCEDURE — 3051F HG A1C>EQUAL 7.0%<8.0%: CPT | Performed by: PHYSICIAN ASSISTANT

## 2024-11-20 PROCEDURE — 3008F BODY MASS INDEX DOCD: CPT | Performed by: PHYSICIAN ASSISTANT

## 2024-11-20 PROCEDURE — 3048F LDL-C <100 MG/DL: CPT | Performed by: PHYSICIAN ASSISTANT

## 2024-11-20 RX ORDER — METHOCARBAMOL 500 MG/1
TABLET, FILM COATED ORAL
Qty: 60 TABLET | Refills: 2 | Status: SHIPPED | OUTPATIENT
Start: 2024-11-20

## 2024-11-20 RX ORDER — PREDNISONE 20 MG/1
20 TABLET ORAL DAILY
Qty: 7 TABLET | Refills: 0 | Status: SHIPPED | OUTPATIENT
Start: 2024-11-20

## 2024-11-20 ASSESSMENT — PAIN - FUNCTIONAL ASSESSMENT: PAIN_FUNCTIONAL_ASSESSMENT: 0-10

## 2024-11-20 NOTE — PROGRESS NOTES
Richard Contreras 49 y.o. presents to office with left buttocks pain.    She has been having increased pain Since October of 2024. She noticed increased pain after PT for right foot reconstructive surgery. She reports a pain of 10/10 burning radiating into the groin and down into her shin. She feels like the muscle is tearing an pulling with sudden movements. She has tried chiropractor and massage therapist with no relief. She has also tired prescribed muscle relaxers that have provided some relief. She has never had PT for the back or hip pain. She mentions left leg weakness and cannot walk longer than 10-15min. She denies any recent injuries, falls, balance issues or bowel/bladder issues and no numbness or tingling.    PMH remarkable for right ankle arthroscopy and debridement May 2024. Nonsmoker and allergies to NSAID with ELIZA.  Family, social, and medical histories are obtained and reviewed.    ROS: All other systems have been reviewed and are negative except as previously noted in history of present illness.    Physical Exam:  Const: Well-appearing, well-nourished overweight female in no distress.  Eyes: Normal appearing sclera and conjunctiva, no jaundice, pupils normal in appearance.  Resp: breathing comfortably, normal respiratory rate.  CV: No upper or lower extremity edema.  Musculoskeletal: Normal gait.  Able to heel/toe walk without difficulty.  Lumbar ROM is supple.  Strength exam of the lower extremities reveals 5/5 strength in all major muscle groups with the exception of 4/5 strength of the left hip flexors.  Negative straight leg raise bilaterally.  Neuro: Sensation is intact and equal bilaterally. Deep tendon reflexes are normal and symmetric.  No clonus.  Skin: Intact without any lesions, normal turgor.  Psych: Alert and oriented x3, normal mood and affect.    The plan is to start conservative treatment for her low back pain with left leg radiculopathy.  A referral for physical therapy was  provided today.  Prescriptions for prednisone 20 mg and methocarbamol 500 mg were sent to her pharmacy for inflammation and muscle spasms.  If she does not have improvement after 6 weeks of physical therapy she should follow-up with me at which time I would consider an MRI of the lumbar spine.    **This note was dictated using speech recognition software and was not corrected for spelling or grammatical errors**

## 2024-12-11 ENCOUNTER — APPOINTMENT (OUTPATIENT)
Dept: ORTHOPEDIC SURGERY | Facility: CLINIC | Age: 49
End: 2024-12-11
Payer: COMMERCIAL

## 2024-12-17 DIAGNOSIS — K51.919 ULCERATIVE COLITIS WITH COMPLICATION, UNSPECIFIED LOCATION (MULTI): ICD-10-CM

## 2024-12-18 RX ORDER — MESALAMINE 800 MG/1
1600 TABLET, DELAYED RELEASE ORAL 2 TIMES DAILY
Qty: 120 TABLET | Refills: 10 | Status: SHIPPED | OUTPATIENT
Start: 2024-12-18

## 2025-01-06 ENCOUNTER — LAB (OUTPATIENT)
Dept: LAB | Facility: LAB | Age: 50
End: 2025-01-06
Payer: COMMERCIAL

## 2025-01-06 DIAGNOSIS — Z00.00 ENCOUNTER FOR ANNUAL PHYSICAL EXAM: ICD-10-CM

## 2025-01-06 DIAGNOSIS — N39.0 ACUTE UTI: ICD-10-CM

## 2025-01-06 DIAGNOSIS — K51.919 ULCERATIVE COLITIS WITH COMPLICATION, UNSPECIFIED LOCATION (MULTI): ICD-10-CM

## 2025-01-06 DIAGNOSIS — K51.00 ULCERATIVE PANCOLITIS WITHOUT COMPLICATION (MULTI): ICD-10-CM

## 2025-01-06 LAB
ALBUMIN SERPL BCP-MCNC: 4.2 G/DL (ref 3.4–5)
ALP SERPL-CCNC: 137 U/L (ref 33–110)
ALT SERPL W P-5'-P-CCNC: 24 U/L (ref 7–45)
ANION GAP SERPL CALC-SCNC: 21 MMOL/L (ref 10–20)
APPEARANCE UR: CLEAR
AST SERPL W P-5'-P-CCNC: 30 U/L (ref 9–39)
BASOPHILS # BLD AUTO: 0.04 X10*3/UL (ref 0–0.1)
BASOPHILS NFR BLD AUTO: 0.5 %
BILIRUB SERPL-MCNC: 0.8 MG/DL (ref 0–1.2)
BILIRUB UR STRIP.AUTO-MCNC: NEGATIVE MG/DL
BUN SERPL-MCNC: 18 MG/DL (ref 6–23)
CALCIUM SERPL-MCNC: 10.2 MG/DL (ref 8.6–10.6)
CHLORIDE SERPL-SCNC: 98 MMOL/L (ref 98–107)
CHOLEST SERPL-MCNC: 221 MG/DL (ref 0–199)
CHOLESTEROL/HDL RATIO: 4
CO2 SERPL-SCNC: 25 MMOL/L (ref 21–32)
COLOR UR: ABNORMAL
CREAT SERPL-MCNC: 0.65 MG/DL (ref 0.5–1.05)
CRP SERPL-MCNC: 1.94 MG/DL
EGFRCR SERPLBLD CKD-EPI 2021: >90 ML/MIN/1.73M*2
EOSINOPHIL # BLD AUTO: 0.05 X10*3/UL (ref 0–0.7)
EOSINOPHIL NFR BLD AUTO: 0.7 %
ERYTHROCYTE [DISTWIDTH] IN BLOOD BY AUTOMATED COUNT: 13.4 % (ref 11.5–14.5)
ERYTHROCYTE [SEDIMENTATION RATE] IN BLOOD BY WESTERGREN METHOD: 50 MM/H (ref 0–20)
EST. AVERAGE GLUCOSE BLD GHB EST-MCNC: 209 MG/DL
GLUCOSE SERPL-MCNC: 182 MG/DL (ref 74–99)
GLUCOSE UR STRIP.AUTO-MCNC: ABNORMAL MG/DL
HBA1C MFR BLD: 8.9 %
HCT VFR BLD AUTO: 46 % (ref 36–46)
HDLC SERPL-MCNC: 55.9 MG/DL
HGB BLD-MCNC: 14.6 G/DL (ref 12–16)
IMM GRANULOCYTES # BLD AUTO: 0.02 X10*3/UL (ref 0–0.7)
IMM GRANULOCYTES NFR BLD AUTO: 0.3 % (ref 0–0.9)
KETONES UR STRIP.AUTO-MCNC: NEGATIVE MG/DL
LDLC SERPL CALC-MCNC: ABNORMAL MG/DL
LEUKOCYTE ESTERASE UR QL STRIP.AUTO: NEGATIVE
LYMPHOCYTES # BLD AUTO: 1.53 X10*3/UL (ref 1.2–4.8)
LYMPHOCYTES NFR BLD AUTO: 19.9 %
MCH RBC QN AUTO: 28 PG (ref 26–34)
MCHC RBC AUTO-ENTMCNC: 31.7 G/DL (ref 32–36)
MCV RBC AUTO: 88 FL (ref 80–100)
MONOCYTES # BLD AUTO: 0.49 X10*3/UL (ref 0.1–1)
MONOCYTES NFR BLD AUTO: 6.4 %
NEUTROPHILS # BLD AUTO: 5.55 X10*3/UL (ref 1.2–7.7)
NEUTROPHILS NFR BLD AUTO: 72.2 %
NITRITE UR QL STRIP.AUTO: NEGATIVE
NON HDL CHOLESTEROL: 165 MG/DL (ref 0–149)
NRBC BLD-RTO: 0 /100 WBCS (ref 0–0)
PH UR STRIP.AUTO: 5.5 [PH]
PLATELET # BLD AUTO: 408 X10*3/UL (ref 150–450)
POTASSIUM SERPL-SCNC: 4.1 MMOL/L (ref 3.5–5.3)
PROT SERPL-MCNC: 7.1 G/DL (ref 6.4–8.2)
PROT UR STRIP.AUTO-MCNC: NEGATIVE MG/DL
RBC # BLD AUTO: 5.22 X10*6/UL (ref 4–5.2)
RBC # UR STRIP.AUTO: NEGATIVE /UL
SODIUM SERPL-SCNC: 140 MMOL/L (ref 136–145)
SP GR UR STRIP.AUTO: 1.04
TRIGL SERPL-MCNC: 439 MG/DL (ref 0–149)
TSH SERPL-ACNC: 3.6 MIU/L (ref 0.44–3.98)
UROBILINOGEN UR STRIP.AUTO-MCNC: NORMAL MG/DL
VLDL: ABNORMAL
WBC # BLD AUTO: 7.7 X10*3/UL (ref 4.4–11.3)

## 2025-01-06 PROCEDURE — 81003 URINALYSIS AUTO W/O SCOPE: CPT

## 2025-01-06 PROCEDURE — 85025 COMPLETE CBC W/AUTO DIFF WBC: CPT

## 2025-01-06 PROCEDURE — 85652 RBC SED RATE AUTOMATED: CPT

## 2025-01-06 PROCEDURE — 86140 C-REACTIVE PROTEIN: CPT

## 2025-01-06 PROCEDURE — 80053 COMPREHEN METABOLIC PANEL: CPT

## 2025-01-06 PROCEDURE — 80061 LIPID PANEL: CPT

## 2025-01-06 PROCEDURE — 83036 HEMOGLOBIN GLYCOSYLATED A1C: CPT

## 2025-01-06 PROCEDURE — 84443 ASSAY THYROID STIM HORMONE: CPT

## 2025-01-07 ENCOUNTER — TELEPHONE (OUTPATIENT)
Dept: PRIMARY CARE | Facility: CLINIC | Age: 50
End: 2025-01-07
Payer: COMMERCIAL

## 2025-01-07 LAB — HOLD SPECIMEN: NORMAL

## 2025-01-07 NOTE — TELEPHONE ENCOUNTER
Diabetes test which is a 3 mo average has increased significantly. Need to discuss diabetes management further. Pls ron appt for preop/DM2 mgnt     TG also quite elevated d/t diabetes which we will work to get under better control

## 2025-01-15 ENCOUNTER — APPOINTMENT (OUTPATIENT)
Dept: PRIMARY CARE | Facility: CLINIC | Age: 50
End: 2025-01-15
Payer: COMMERCIAL

## 2025-01-15 VITALS
DIASTOLIC BLOOD PRESSURE: 80 MMHG | RESPIRATION RATE: 16 BRPM | HEIGHT: 61 IN | HEART RATE: 79 BPM | TEMPERATURE: 97.4 F | BODY MASS INDEX: 40.59 KG/M2 | WEIGHT: 215 LBS | OXYGEN SATURATION: 96 % | SYSTOLIC BLOOD PRESSURE: 126 MMHG

## 2025-01-15 DIAGNOSIS — R11.0 NAUSEA: ICD-10-CM

## 2025-01-15 DIAGNOSIS — E11.9 TYPE 2 DIABETES MELLITUS WITHOUT COMPLICATION, WITHOUT LONG-TERM CURRENT USE OF INSULIN (MULTI): ICD-10-CM

## 2025-01-15 DIAGNOSIS — I10 HYPERTENSION, UNSPECIFIED TYPE: ICD-10-CM

## 2025-01-15 DIAGNOSIS — Z01.818 PREOP EXAM FOR INTERNAL MEDICINE: Primary | ICD-10-CM

## 2025-01-15 PROCEDURE — 3074F SYST BP LT 130 MM HG: CPT | Performed by: FAMILY MEDICINE

## 2025-01-15 PROCEDURE — 1036F TOBACCO NON-USER: CPT | Performed by: FAMILY MEDICINE

## 2025-01-15 PROCEDURE — 99214 OFFICE O/P EST MOD 30 MIN: CPT | Performed by: FAMILY MEDICINE

## 2025-01-15 PROCEDURE — 93000 ELECTROCARDIOGRAM COMPLETE: CPT | Performed by: FAMILY MEDICINE

## 2025-01-15 PROCEDURE — 3079F DIAST BP 80-89 MM HG: CPT | Performed by: FAMILY MEDICINE

## 2025-01-15 PROCEDURE — 3008F BODY MASS INDEX DOCD: CPT | Performed by: FAMILY MEDICINE

## 2025-01-15 PROCEDURE — 3052F HG A1C>EQUAL 8.0%<EQUAL 9.0%: CPT | Performed by: FAMILY MEDICINE

## 2025-01-15 RX ORDER — ONDANSETRON 4 MG/1
4 TABLET, FILM COATED ORAL EVERY 6 HOURS PRN
Qty: 20 TABLET | Refills: 3 | Status: SHIPPED | OUTPATIENT
Start: 2025-01-15

## 2025-01-15 RX ORDER — TIRZEPATIDE 5 MG/.5ML
5 INJECTION, SOLUTION SUBCUTANEOUS WEEKLY
Qty: 2 ML | Refills: 11 | Status: SHIPPED | OUTPATIENT
Start: 2025-01-15

## 2025-01-15 RX ORDER — TRIAMTERENE AND HYDROCHLOROTHIAZIDE 37.5; 25 MG/1; MG/1
1 CAPSULE ORAL DAILY
Qty: 90 CAPSULE | Refills: 3 | Status: SHIPPED | OUTPATIENT
Start: 2025-01-15 | End: 2026-01-15

## 2025-01-15 ASSESSMENT — PATIENT HEALTH QUESTIONNAIRE - PHQ9
1. LITTLE INTEREST OR PLEASURE IN DOING THINGS: NOT AT ALL
2. FEELING DOWN, DEPRESSED OR HOPELESS: NOT AT ALL
SUM OF ALL RESPONSES TO PHQ9 QUESTIONS 1 AND 2: 0

## 2025-01-15 NOTE — PROGRESS NOTES
Subjective   Richard Contreras is a 49 y.o. female who presents for surgery clearance .  HPI    Here for preop for hand surg.   Will get nauseated and itchy w anesthesia. No cp, sob or syncope. Can swim 1 mi.   Had R ankle reconstruction last yr and did well w anesthesia besides known rxn.     Ongoing L leg radicular Sx. Has not taken steroids bc SE.     Metformin SE w jitteriness, irritable, GI side effects. BS has been increasing. SE w freq UTI and yeast inf w jardiance   Current Outpatient Medications on File Prior to Visit   Medication Sig Dispense Refill    acetaminophen (Tylenol) 325 mg tablet Take 1 tablet (325 mg) by mouth if needed.      atorvastatin (Lipitor) 40 mg tablet Take 1 tablet (40 mg) by mouth once daily at bedtime. 90 tablet 3    buPROPion XL (Wellbutrin XL) 150 mg 24 hr tablet Take 1 tablet (150 mg) by mouth once daily. 90 tablet 3    empagliflozin (Jardiance) 25 mg Take 1 tablet (25 mg) by mouth once daily. 90 tablet 3    EPINEPHrine 0.3 mg/0.3 mL injection syringe INJECT 0.3 ML (0.3 MG) INTO THE MUSCLE 1 TIME IF NEEDED FOR ANAPHYLAXIS. INJECT INTO THE UPPER LEG. CALL 911 AFTER USE. 1 each 1    estradiol (Estrace) 0.5 mg tablet Take 1 tablet (0.5 mg) by mouth once daily. 90 tablet 3    ezetimibe (Zetia) 10 mg tablet Take 1 tablet (10 mg) by mouth once daily at bedtime. 90 tablet 3    FLUoxetine (PROzac) 10 mg capsule Take 1 capsule (10 mg) by mouth once daily. 90 capsule 3    gabapentin (Neurontin) 300 mg capsule Take 2 capsules (600 mg) by mouth 3 times a day. 180 capsule 11    hydrOXYzine HCL (Atarax) 10 mg tablet Take 1-2 tablets (10-20 mg) by mouth 2 times a day as needed for anxiety. 180 tablet 1    hyoscyamine (Levsin) 0.125 mg tablet Take 1 tablet (0.125 mg) by mouth every 4 hours if needed for cramping for up to 10 days. 30 tablet 2    mesalamine (Asacol) 800 mg EC tablet Take 2 tablets (1,600 mg) by mouth 2 times a day. Do not crush, chew, or split. 120 tablet 10    methocarbamol  "(Robaxin) 500 mg tablet Take 1-2 tabs every 8 hours as needed for spasms 60 tablet 2    naratriptan (Amerge) 2.5 mg tablet Take 1 tablet (2.5 mg) by mouth 1 time if needed.      Nurtec ODT 75 mg tablet,disintegrating DISSOLVE ONE TABLET IN MOUTH once DAILY as needed for migraines. 8 tablet 3    omeprazole (PriLOSEC) 40 mg DR capsule Take 1 capsule (40 mg) by mouth once daily in the morning. Take before meals. Do not crush or chew. 90 capsule 3    oxyCODONE-acetaminophen (Percocet) 5-325 mg tablet Take 1 tablet by mouth every 6 hours if needed.      rizatriptan (Maxalt) 10 mg tablet Take by mouth. TAKE 1 TABLET BY MOUTH AS NEEDED FOR MIGRAINE HEADACHE (SEE ADMINISTRATION INSTRUCTIONS). MAY REPEAT IN 2 HOURS IF NEEDED.      traMADol (Ultram) 50 mg tablet Take 1 tablet (50 mg) by mouth 3 times a day.      [DISCONTINUED] ondansetron (Zofran) 4 mg tablet Take 1 tablet (4 mg) by mouth every 6 hours if needed for nausea.      [DISCONTINUED] triamterene-hydrochlorothiazid (Dyazide) 37.5-25 mg capsule Take 1 capsule by mouth once daily. 90 capsule 3    benzonatate (Tessalon) 100 mg capsule Take 1 capsule (100 mg) by mouth 3 times a day as needed. (Patient not taking: Reported on 1/15/2025)      DULoxetine (Cymbalta) 30 mg DR capsule Take 1 capsule (30 mg) by mouth once daily. (Patient not taking: Reported on 1/15/2025)      predniSONE (Deltasone) 20 mg tablet Take 1 tablet (20 mg) by mouth once daily. (Patient not taking: Reported on 1/15/2025) 7 tablet 0     No current facility-administered medications on file prior to visit.                  Objective   /80   Pulse 79   Temp 36.3 °C (97.4 °F)   Resp 16   Ht 1.549 m (5' 1\")   Wt 97.5 kg (215 lb)   SpO2 96%   BMI 40.62 kg/m²    Physical Exam  General: NAD  HEENT:NCAT, PERRLA, nml OP  Neck: no cervical CAITLYN  Heart: RRR no murmur, no edema   Lungs: CTA b/l, no wheeze or rhonchi   GI: abd soft, nontender, nondistended.   MSK: no c/c/e. nml gait   Skin: warm and " dry  Psych: cooperative, appropriate affect  Neuro: speech clear. A&Ox3  Assessment/Plan   Problem List Items Addressed This Visit       Diabetes mellitus type 2, uncomplicated (Multi)  UNCONTROLLED w jardiance. Metformin SE   Start mounjaro 2.5 mg x 1 mo then inc 5 mg  Sample first mo provided  Start after surg   No contraind   Will cont jardiance for now but hope to DC once A1c at goal   F/up 2 mo     Relevant Medications    tirzepatide (Mounjaro) 5 mg/0.5 mL pen injector     Other Visit Diagnoses       Preop exam for internal medicine    -  Primary  MEDICALLY CLEARED FOR UPCOMING HAND ORTHOPEDIC PROCEDURE  METS >7, no addtl CV testing warranted.   Of note, diabetes is not controlled with an A1c of 8.9 but we are addressing. Sugar not extremely elevated that would preclude her from having surgery   Otherwise, EKG/CBC/BMP nml   She does has anesthesia related nausea, which zofran was sent.     Relevant Orders    ECG 12 lead (Clinic Performed) (Completed)    Hypertension, unspecified type      Controlled  RF dyazide       Relevant Medications    triamterene-hydrochlorothiazid (Dyazide) 37.5-25 mg capsule    Nausea      Zofran RF     Relevant Medications    ondansetron (Zofran) 4 mg tablet

## 2025-01-20 DIAGNOSIS — F41.9 ANXIETY: ICD-10-CM

## 2025-01-20 RX ORDER — FLUOXETINE 10 MG/1
10 CAPSULE ORAL DAILY
Qty: 90 CAPSULE | Refills: 3 | Status: SHIPPED | OUTPATIENT
Start: 2025-01-20

## 2025-01-22 DIAGNOSIS — F41.9 ANXIETY: ICD-10-CM

## 2025-01-22 DIAGNOSIS — E78.49 OTHER HYPERLIPIDEMIA: ICD-10-CM

## 2025-01-22 RX ORDER — BUPROPION HYDROCHLORIDE 150 MG/1
150 TABLET ORAL DAILY
Qty: 90 TABLET | Refills: 3 | Status: SHIPPED | OUTPATIENT
Start: 2025-01-22

## 2025-01-22 RX ORDER — EZETIMIBE 10 MG/1
10 TABLET ORAL NIGHTLY
Qty: 90 TABLET | Refills: 3 | Status: SHIPPED | OUTPATIENT
Start: 2025-01-22

## 2025-01-29 DIAGNOSIS — E78.49 OTHER HYPERLIPIDEMIA: ICD-10-CM

## 2025-01-29 DIAGNOSIS — E28.319 PREMATURE MENOPAUSE ON HORMONE REPLACEMENT THERAPY: ICD-10-CM

## 2025-01-29 DIAGNOSIS — Z79.890 PREMATURE MENOPAUSE ON HORMONE REPLACEMENT THERAPY: ICD-10-CM

## 2025-01-29 RX ORDER — ATORVASTATIN CALCIUM 40 MG/1
40 TABLET, FILM COATED ORAL NIGHTLY
Qty: 90 TABLET | Refills: 3 | Status: SHIPPED | OUTPATIENT
Start: 2025-01-29

## 2025-01-29 RX ORDER — ESTRADIOL 0.5 MG/1
0.5 TABLET ORAL DAILY
Qty: 90 TABLET | Refills: 3 | Status: SHIPPED | OUTPATIENT
Start: 2025-01-29

## 2025-02-28 ENCOUNTER — APPOINTMENT (OUTPATIENT)
Dept: ALLERGY | Facility: CLINIC | Age: 50
End: 2025-02-28
Payer: COMMERCIAL

## 2025-02-28 VITALS
HEART RATE: 102 BPM | WEIGHT: 208 LBS | BODY MASS INDEX: 39.3 KG/M2 | SYSTOLIC BLOOD PRESSURE: 136 MMHG | DIASTOLIC BLOOD PRESSURE: 87 MMHG

## 2025-02-28 DIAGNOSIS — Z88.9 DRUG ALLERGY: ICD-10-CM

## 2025-02-28 DIAGNOSIS — J30.1 ALLERGIC RHINITIS DUE TO POLLEN, UNSPECIFIED SEASONALITY: Primary | ICD-10-CM

## 2025-02-28 DIAGNOSIS — Z91.018 ALLERGY TO MANGO FRUIT: ICD-10-CM

## 2025-02-28 DIAGNOSIS — L50.0 ALLERGIC URTICARIA: ICD-10-CM

## 2025-02-28 PROCEDURE — 95004 PERQ TESTS W/ALRGNC XTRCS: CPT | Performed by: ALLERGY & IMMUNOLOGY

## 2025-02-28 PROCEDURE — 99204 OFFICE O/P NEW MOD 45 MIN: CPT | Performed by: ALLERGY & IMMUNOLOGY

## 2025-02-28 RX ORDER — AZELASTINE 1 MG/ML
2 SPRAY, METERED NASAL 2 TIMES DAILY
Qty: 30 ML | Refills: 5 | Status: SHIPPED | OUTPATIENT
Start: 2025-02-28 | End: 2026-02-28

## 2025-02-28 NOTE — LETTER
February 28, 2025     Brittany Behm, DO  [address] [facility], [address]  [facility] [address]    Patient: Richard Contreras   YOB: 1975   Date of Visit: 2/28/2025       Dear Dr. Brittany Behm, DO:    Thank you for referring Richard Contreras to me for evaluation. Below are my notes for this consultation.  If you have questions, please do not hesitate to call me. I look forward to following your patient along with you.       Sincerely,     Stoney Gonzalez MD      CC: No Recipients  ______________________________________________________________________________________    Subjective   Patient ID:   [id number]   Richard Contreras is a 49 y.o. female who presents for Allergies (Has some food allergies, including mangos & wine.  Last fall on vacation spent the whole time with a swollen tongue even though she didn't eat mangos. Dental appointment last week, dentist noticed white spot on tongue that rests against her crown.  Concerned about a metal reaction. Also concerned about environmental allergies.).    Chief Complaint   Patient presents with   • Allergies     Has some food allergies, including mangos & wine.  Last fall on vacation spent the whole time with a swollen tongue even though she didn't eat mangos. Dental appointment last week, dentist noticed white spot on tongue that rests against her crown.  Concerned about a metal reaction. Also concerned about environmental allergies.          HPI  This patient is here to evaluate for:  Allergic rhinitis and conjunctivitis, FOOD ALLERGY, METAL ALLERGYs  Sent by PCP, Dr. Behm.    Allergic rhinitis and conjunctivitis -  Itchy and watery nose and eyes.  When puts [place] tree up, very stuffy  Also with changes in weather, and pollen.   Uses Claritin during the pollen season, for these symptoms.  Prefers a as needed med, not to use a daily medicine.    Lillian ingestion allergy  Started 2 years ago.  Tongues swelling, can  hear it in  Ear and throat itching  Similar reaction with wine as well. Sparkling wine.  Kiwi- similar as well.  Given epipen for this allergy    She has cross-contamination when she is at [place] - vinay is everywhere at restaurants.    Symptoms Better with benadryl.    Also, dentist had concerns about a white spot on her tongue; it sits up against a crown she has had since college. Unknown materials. He requested she get metal allergy tested for this.   Will biopsy if negative or if changes.  No history of tobacco usage of any form.    NSAIDs - in college broke foot, in ED, had toradol but kidney failure developed.  She went back to school but back hurting and next day, creatinine level very high and admitted for 10 days  Also given Cephalexin - Hives at the same time. Hives occurred in the hospital but timing not clear.   She has avoided nsaids and cephalosporins sicne then  OK with PENICILLIN    Pmhx:   Thumb surgery, recent  Ulcerative colitis  Hydroxyzine taken for anxiety - last taken 2-3 days ago.      Review of Systems   All other systems reviewed and are negative.        Objective     /87 (BP Location: Left arm, Patient Position: Sitting)   Pulse 102   Wt 94.3 kg (208 lb)   BMI 39.30 kg/m²      Physical Exam  Constitutional:       General: She is not in acute distress.     Appearance: Normal appearance. She is not ill-appearing.   HENT:      Head: Normocephalic and atraumatic.      Right Ear: Tympanic membrane, ear canal and external ear normal.      Left Ear: Tympanic membrane, ear canal and external ear normal.      Nose: Nose normal. No congestion or rhinorrhea.      Mouth/Throat:      Mouth: Mucous membranes are moist.      Pharynx: Oropharynx is clear. No oropharyngeal exudate or posterior oropharyngeal erythema.      Comments: Right posterior lateral tongue with white spot; no bleeding.  Eyes:      General:         Right eye: No discharge.         Left eye: No discharge.       Conjunctiva/sclera: Conjunctivae normal.   Cardiovascular:      Rate and Rhythm: Normal rate and regular rhythm.      Heart sounds: Normal heart sounds. No murmur heard.     No friction rub. No gallop.   Pulmonary:      Effort: Pulmonary effort is normal. No respiratory distress.      Breath sounds: Normal breath sounds. No stridor. No wheezing, rhonchi or rales.   Chest:      Chest wall: No tenderness.   Abdominal:      General: Abdomen is flat.      Palpations: Abdomen is soft.   Musculoskeletal:         General: Normal range of motion.      Cervical back: Normal range of motion and neck supple.   Lymphadenopathy:      Cervical: No cervical adenopathy.   Skin:     General: Skin is warm and dry.      Findings: No erythema, lesion or rash.   Neurological:      General: No focal deficit present.      Mental Status: She is alert. Mental status is at baseline.   Psychiatric:         Mood and Affect: Mood normal.         Behavior: Behavior normal.         Thought Content: Thought content normal.         Judgment: Judgment normal.          Current Outpatient Medications   Medication Sig Dispense Refill   • acetaminophen (Tylenol) 325 mg tablet Take 1 tablet (325 mg) by mouth if needed.     • atorvastatin (Lipitor) 40 mg tablet TAKE ONE TABLET BY MOUTH ONCE DAILY AT BEDTIME 90 tablet 3   • benzonatate (Tessalon) 100 mg capsule Take 1 capsule (100 mg) by mouth 3 times a day as needed. (Patient not taking: Reported on 1/15/2025)     • buPROPion XL (Wellbutrin XL) 150 mg 24 hr tablet TAKE ONE TABLET BY MOUTH once DAILY 90 tablet 3   • DULoxetine (Cymbalta) 30 mg DR capsule Take 1 capsule (30 mg) by mouth once daily. (Patient not taking: Reported on 1/15/2025)     • empagliflozin (Jardiance) 25 mg Take 1 tablet (25 mg) by mouth once daily. 90 tablet 3   • EPINEPHrine 0.3 mg/0.3 mL injection syringe INJECT 0.3 ML (0.3 MG) INTO THE MUSCLE 1 TIME IF NEEDED FOR ANAPHYLAXIS. INJECT INTO THE UPPER LEG. CALL 911 AFTER USE. 1 each 1    • estradiol (Estrace) 0.5 mg tablet TAKE ONE TABLET BY MOUTH ONCE DAILY 90 tablet 3   • ezetimibe (Zetia) 10 mg tablet TAKE ONE TABLET BY MOUTH ONCE DAILY AT BEDTIME. 90 tablet 3   • FLUoxetine (PROzac) 10 mg capsule TAKE ONE CAPSULE BY MOUTH ONCE DAILY 90 capsule 3   • gabapentin (Neurontin) 300 mg capsule Take 2 capsules (600 mg) by mouth 3 times a day. 180 capsule 11   • hydrOXYzine HCL (Atarax) 10 mg tablet Take 1-2 tablets (10-20 mg) by mouth 2 times a day as needed for anxiety. 180 tablet 1   • hyoscyamine (Levsin) 0.125 mg tablet Take 1 tablet (0.125 mg) by mouth every 4 hours if needed for cramping for up to 10 days. 30 tablet 2   • mesalamine (Asacol) 800 mg EC tablet Take 2 tablets (1,600 mg) by mouth 2 times a day. Do not crush, chew, or split. 120 tablet 10   • methocarbamol (Robaxin) 500 mg tablet Take 1-2 tabs every 8 hours as needed for spasms 60 tablet 2   • naratriptan (Amerge) 2.5 mg tablet Take 1 tablet (2.5 mg) by mouth 1 time if needed.     • Nurtec ODT 75 mg tablet,disintegrating DISSOLVE ONE TABLET IN MOUTH once DAILY as needed for migraines. 8 tablet 3   • omeprazole (PriLOSEC) 40 mg DR capsule Take 1 capsule (40 mg) by mouth once daily in the morning. Take before meals. Do not crush or chew. 90 capsule 3   • ondansetron (Zofran) 4 mg tablet Take 1 tablet (4 mg) by mouth every 6 hours if needed for nausea. 20 tablet 3   • oxyCODONE-acetaminophen (Percocet) 5-325 mg tablet Take 1 tablet by mouth every 6 hours if needed.     • predniSONE (Deltasone) 20 mg tablet Take 1 tablet (20 mg) by mouth once daily. (Patient not taking: Reported on 1/15/2025) 7 tablet 0   • rizatriptan (Maxalt) 10 mg tablet Take by mouth. TAKE 1 TABLET BY MOUTH AS NEEDED FOR MIGRAINE HEADACHE (SEE ADMINISTRATION INSTRUCTIONS). MAY REPEAT IN 2 HOURS IF NEEDED.     • tirzepatide (Mounjaro) 5 mg/0.5 mL pen injector Inject 5 mg under the skin 1 (one) time per week. 2 mL 11   • traMADol (Ultram) 50 mg tablet Take 1 tablet (50  mg) by mouth 3 times a day.     • triamterene-hydrochlorothiazid (Dyazide) 37.5-25 mg capsule Take 1 capsule by mouth once daily. 90 capsule 3     No current facility-administered medications for this visit.       Summary of the labs over the past 6 months:    Lab on 01/06/2025   Component Date Value Ref Range Status   • WBC 01/06/2025 7.7  4.4 - 11.3 x10*3/uL Final   • nRBC 01/06/2025 0.0  0.0 - 0.0 /100 WBCs Final   • RBC 01/06/2025 5.22 (H)  4.00 - 5.20 x10*6/uL Final   • Hemoglobin 01/06/2025 14.6  12.0 - 16.0 g/dL Final   • Hematocrit 01/06/2025 46.0  36.0 - 46.0 % Final   • MCV 01/06/2025 88  80 - 100 fL Final   • MCH 01/06/2025 28.0  26.0 - 34.0 pg Final   • MCHC 01/06/2025 31.7 (L)  32.0 - 36.0 g/dL Final   • RDW 01/06/2025 13.4  11.5 - 14.5 % Final   • Platelets 01/06/2025 408  150 - 450 x10*3/uL Final   • Neutrophils % 01/06/2025 72.2  40.0 - 80.0 % Final   • Immature Granulocytes %, Automated 01/06/2025 0.3  0.0 - 0.9 % Final   • Lymphocytes % 01/06/2025 19.9  13.0 - 44.0 % Final   • Monocytes % 01/06/2025 6.4  2.0 - 10.0 % Final   • Eosinophils % 01/06/2025 0.7  0.0 - 6.0 % Final   • Basophils % 01/06/2025 0.5  0.0 - 2.0 % Final   • Neutrophils Absolute 01/06/2025 5.55  1.20 - 7.70 x10*3/uL Final   • Immature Granulocytes Absolute, Au* 01/06/2025 0.02  0.00 - 0.70 x10*3/uL Final   • Lymphocytes Absolute 01/06/2025 1.53  1.20 - 4.80 x10*3/uL Final   • Monocytes Absolute 01/06/2025 0.49  0.10 - 1.00 x10*3/uL Final   • Eosinophils Absolute 01/06/2025 0.05  0.00 - 0.70 x10*3/uL Final   • Basophils Absolute 01/06/2025 0.04  0.00 - 0.10 x10*3/uL Final   • Glucose 01/06/2025 182 (H)  74 - 99 mg/dL Final   • Sodium 01/06/2025 140  136 - 145 mmol/L Final   • Potassium 01/06/2025 4.1  3.5 - 5.3 mmol/L Final   • Chloride 01/06/2025 98  98 - 107 mmol/L Final   • Bicarbonate 01/06/2025 25  21 - 32 mmol/L Final   • Anion Gap 01/06/2025 21 (H)  10 - 20 mmol/L Final   • Urea Nitrogen 01/06/2025 18  6 - 23 mg/dL Final    • Creatinine 01/06/2025 0.65  0.50 - 1.05 mg/dL Final   • eGFR 01/06/2025 >90  >60 mL/min/1.73m*2 Final   • Calcium 01/06/2025 10.2  8.6 - 10.6 mg/dL Final   • Albumin 01/06/2025 4.2  3.4 - 5.0 g/dL Final   • Alkaline Phosphatase 01/06/2025 137 (H)  33 - 110 U/L Final   • Total Protein 01/06/2025 7.1  6.4 - 8.2 g/dL Final   • AST 01/06/2025 30  9 - 39 U/L Final   • Bilirubin, Total 01/06/2025 0.8  0.0 - 1.2 mg/dL Final   • ALT 01/06/2025 24  7 - 45 U/L Final   • Hemoglobin A1C 01/06/2025 8.9 (H)  See comment % Final   • Estimated Average Glucose 01/06/2025 209  Not Established mg/dL Final   • Cholesterol 01/06/2025 221 (H)  0 - 199 mg/dL Final   • HDL-Cholesterol 01/06/2025 55.9  mg/dL Final   • Cholesterol/HDL Ratio 01/06/2025 4.0   Final   • LDL Calculated 01/06/2025    Final   • VLDL 01/06/2025    Final   • Triglycerides 01/06/2025 439 (H)  0 - 149 mg/dL Final   • Non HDL Cholesterol 01/06/2025 165 (H)  0 - 149 mg/dL Final   • Thyroid Stimulating Hormone 01/06/2025 3.60  0.44 - 3.98 mIU/L Final   • Sedimentation Rate 01/06/2025 50 (H)  0 - 20 mm/h Final   • C-Reactive Protein 01/06/2025 1.94 (H)  <1.00 mg/dL Final   • Color, Urine 01/06/2025 Light-Yellow  Light-Yellow, Yellow, Dark-Yellow Final   • Appearance, Urine 01/06/2025 Clear  Clear Final   • Specific Gravity, Urine 01/06/2025 1.039 (N)  1.005 - 1.035 Final   • pH, Urine 01/06/2025 5.5  5.0, 5.5, 6.0, 6.5, 7.0, 7.5, 8.0 Final   • Protein, Urine 01/06/2025 NEGATIVE  NEGATIVE, 10 (TRACE), 20 (TRACE) mg/dL Final   • Glucose, Urine 01/06/2025 OVER (4+) (A)  Normal mg/dL Final   • Blood, Urine 01/06/2025 NEGATIVE  NEGATIVE Final   • Ketones, Urine 01/06/2025 NEGATIVE  NEGATIVE mg/dL Final   • Bilirubin, Urine 01/06/2025 NEGATIVE  NEGATIVE Final   • Urobilinogen, Urine 01/06/2025 Normal  Normal mg/dL Final   • Nitrite, Urine 01/06/2025 NEGATIVE  NEGATIVE Final   • Leukocyte Esterase, Urine 01/06/2025 NEGATIVE  NEGATIVE Final   • Extra Tube 01/06/2025 Hold for  add-ons.   Final   Lab on 09/11/2024   Component Date Value Ref Range Status   • Color, Urine 09/11/2024 Light-Yellow  Light-Yellow, Yellow, Dark-Yellow Final   • Appearance, Urine 09/11/2024 Clear  Clear Final   • Specific Gravity, Urine 09/11/2024 1.034  1.005 - 1.035 Final   • pH, Urine 09/11/2024 5.5  5.0, 5.5, 6.0, 6.5, 7.0, 7.5, 8.0 Final   • Protein, Urine 09/11/2024 NEGATIVE  NEGATIVE, 10 (TRACE), 20 (TRACE) mg/dL Final   • Glucose, Urine 09/11/2024 OVER (4+) (A)  Normal mg/dL Final   • Blood, Urine 09/11/2024 NEGATIVE  NEGATIVE Final   • Ketones, Urine 09/11/2024 10 (1+) (A)  NEGATIVE mg/dL Final   • Bilirubin, Urine 09/11/2024 NEGATIVE  NEGATIVE Final   • Urobilinogen, Urine 09/11/2024 Normal  Normal mg/dL Final   • Nitrite, Urine 09/11/2024 NEGATIVE  NEGATIVE Final   • Leukocyte Esterase, Urine 09/11/2024 NEGATIVE  NEGATIVE Final   • Extra Tube 09/11/2024 Hold for add-ons.   Final       Scratch skin tests were positive to: tree pollen  Remainder were negative  No allergies to dust mites, molds, cats, or dogs.  Fruit panel and individual vinay tests were negative.      Assessment/Plan   Diagnoses and all orders for this visit:  Allergic rhinitis due to pollen, unspecified seasonality  -     azelastine (Astelin) 137 mcg (0.1 %) nasal spray; Administer 2 sprays into each nostril 2 times a day. 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 We discussed the results of the testing. Also, we started to focus on treating your problem and we reviewed the management plan.    The vinay is an allergy/sensitivity - likely cross reactive with your pollen allergy.   Typically these reactions are LOCAL and can be treated with antihistamines.  Of course, avoidance is very important.     For quick relief of allergic rhinitis and conjunctivitis, will use azelastine  nasal.    We reviewed how to use the nasal spray. Proper usage is important in order to deliver the medicine to where it needs to work and to avoid side effects or nasal irritation. Bend your head down looking at your toes because the nasal passageway goes straight back, not up. Point the spray at 10 and 2 o'clock and breath in through your nose just slightly. You will want to avoid the center part of the nose (septum). It may help to look in the mirror to check your technique. The medicine should not drip out but it is normal to taste it after several minutes.      2. Continue to avoid the nsaids and cephalosporins; she is ok with penicillins.    3. I recommend checking for contact dermatitis with patch testing. The patient understands that the patch test cannot become wet so showers and heavy exercise need to be avoided. Also, please do not use any topical or oral steroids like prednisone as this can interfere with the testing results. But antihistamines for itching can be used. You should bring in any of your own creams, products, etc. that you would like us to also patch test.     If the lesion changes, don't wait - get it biopsied; otherwise, I agree it can wait (up to a few months)    Stnoey Gonzalez MD

## 2025-02-28 NOTE — PROGRESS NOTES
Subjective   Patient ID:   [id number]   Richard Contreras is a 49 y.o. female who presents for Allergies (Has some food allergies, including mangos & wine.  Last fall on vacation spent the whole time with a swollen tongue even though she didn't eat mangos. Dental appointment last week, dentist noticed white spot on tongue that rests against her crown.  Concerned about a metal reaction. Also concerned about environmental allergies.).    Chief Complaint   Patient presents with    Allergies     Has some food allergies, including mangos & wine.  Last fall on vacation spent the whole time with a swollen tongue even though she didn't eat mangos. Dental appointment last week, dentist noticed white spot on tongue that rests against her crown.  Concerned about a metal reaction. Also concerned about environmental allergies.          HPI  This patient is here to evaluate for:  Allergic rhinitis and conjunctivitis, FOOD ALLERGY, METAL ALLERGYs  Sent by PCP, Dr. Behm.    Allergic rhinitis and conjunctivitis -  Itchy and watery nose and eyes.  When puts [place] tree up, very stuffy  Also with changes in weather, and pollen.   Uses Claritin during the pollen season, for these symptoms.  Prefers a as needed med, not to use a daily medicine.    [place] ingestion allergy  Started 2 years ago.  Tongues swelling, can hear it in  Ear and throat itching  Similar reaction with wine as well. Sparkling wine.  Kiwi- similar as well.  Given epipen for this allergy    She has cross-contamination when she is at [employer] - vinay is everywhere at restaurants.    Symptoms Better with benadryl.    Also, dentist had concerns about a white spot on her tongue; it sits up against a crown she has had since college. Unknown materials. He requested she get metal allergy tested for this.   Will biopsy if negative or if changes.  No history of tobacco usage of any form.    NSAIDs - in college broke foot, in ED, had toradol but kidney failure  developed.  She went back to school but back hurting and next day, creatinine level very high and admitted for 10 days  Also given Cephalexin - Hives at the same time. Hives occurred in the hospital but timing not clear.   She has avoided nsaids and cephalosporins sicne then  OK with PENICILLIN    Pmhx:   Thumb surgery, recent  Ulcerative colitis  Hydroxyzine taken for anxiety - last taken 2-3 days ago.      Review of Systems   All other systems reviewed and are negative.        Objective     /87 (BP Location: Left arm, Patient Position: Sitting)   Pulse 102   Wt 94.3 kg (208 lb)   BMI 39.30 kg/m²      Physical Exam  Constitutional:       General: She is not in acute distress.     Appearance: Normal appearance. She is not ill-appearing.   HENT:      Head: Normocephalic and atraumatic.      Right Ear: Tympanic membrane, ear canal and external ear normal.      Left Ear: Tympanic membrane, ear canal and external ear normal.      Nose: Nose normal. No congestion or rhinorrhea.      Mouth/Throat:      Mouth: Mucous membranes are moist.      Pharynx: Oropharynx is clear. No oropharyngeal exudate or posterior oropharyngeal erythema.      Comments: Right posterior lateral tongue with white spot; no bleeding.  Eyes:      General:         Right eye: No discharge.         Left eye: No discharge.      Conjunctiva/sclera: Conjunctivae normal.   Cardiovascular:      Rate and Rhythm: Normal rate and regular rhythm.      Heart sounds: Normal heart sounds. No murmur heard.     No friction rub. No gallop.   Pulmonary:      Effort: Pulmonary effort is normal. No respiratory distress.      Breath sounds: Normal breath sounds. No stridor. No wheezing, rhonchi or rales.   Chest:      Chest wall: No tenderness.   Abdominal:      General: Abdomen is flat.      Palpations: Abdomen is soft.   Musculoskeletal:         General: Normal range of motion.      Cervical back: Normal range of motion and neck supple.   Lymphadenopathy:       Cervical: No cervical adenopathy.   Skin:     General: Skin is warm and dry.      Findings: No erythema, lesion or rash.   Neurological:      General: No focal deficit present.      Mental Status: She is alert. Mental status is at baseline.   Psychiatric:         Mood and Affect: Mood normal.         Behavior: Behavior normal.         Thought Content: Thought content normal.         Judgment: Judgment normal.          Current Outpatient Medications   Medication Sig Dispense Refill    acetaminophen (Tylenol) 325 mg tablet Take 1 tablet (325 mg) by mouth if needed.      atorvastatin (Lipitor) 40 mg tablet TAKE ONE TABLET BY MOUTH ONCE DAILY AT BEDTIME 90 tablet 3    benzonatate (Tessalon) 100 mg capsule Take 1 capsule (100 mg) by mouth 3 times a day as needed. (Patient not taking: Reported on 1/15/2025)      buPROPion XL (Wellbutrin XL) 150 mg 24 hr tablet TAKE ONE TABLET BY MOUTH once DAILY 90 tablet 3    DULoxetine (Cymbalta) 30 mg DR capsule Take 1 capsule (30 mg) by mouth once daily. (Patient not taking: Reported on 1/15/2025)      empagliflozin (Jardiance) 25 mg Take 1 tablet (25 mg) by mouth once daily. 90 tablet 3    EPINEPHrine 0.3 mg/0.3 mL injection syringe INJECT 0.3 ML (0.3 MG) INTO THE MUSCLE 1 TIME IF NEEDED FOR ANAPHYLAXIS. INJECT INTO THE UPPER LEG. CALL 911 AFTER USE. 1 each 1    estradiol (Estrace) 0.5 mg tablet TAKE ONE TABLET BY MOUTH ONCE DAILY 90 tablet 3    ezetimibe (Zetia) 10 mg tablet TAKE ONE TABLET BY MOUTH ONCE DAILY AT BEDTIME. 90 tablet 3    FLUoxetine (PROzac) 10 mg capsule TAKE ONE CAPSULE BY MOUTH ONCE DAILY 90 capsule 3    gabapentin (Neurontin) 300 mg capsule Take 2 capsules (600 mg) by mouth 3 times a day. 180 capsule 11    hydrOXYzine HCL (Atarax) 10 mg tablet Take 1-2 tablets (10-20 mg) by mouth 2 times a day as needed for anxiety. 180 tablet 1    hyoscyamine (Levsin) 0.125 mg tablet Take 1 tablet (0.125 mg) by mouth every 4 hours if needed for cramping for up to 10 days. 30  tablet 2    mesalamine (Asacol) 800 mg EC tablet Take 2 tablets (1,600 mg) by mouth 2 times a day. Do not crush, chew, or split. 120 tablet 10    methocarbamol (Robaxin) 500 mg tablet Take 1-2 tabs every 8 hours as needed for spasms 60 tablet 2    naratriptan (Amerge) 2.5 mg tablet Take 1 tablet (2.5 mg) by mouth 1 time if needed.      Nurtec ODT 75 mg tablet,disintegrating DISSOLVE ONE TABLET IN MOUTH once DAILY as needed for migraines. 8 tablet 3    omeprazole (PriLOSEC) 40 mg DR capsule Take 1 capsule (40 mg) by mouth once daily in the morning. Take before meals. Do not crush or chew. 90 capsule 3    ondansetron (Zofran) 4 mg tablet Take 1 tablet (4 mg) by mouth every 6 hours if needed for nausea. 20 tablet 3    oxyCODONE-acetaminophen (Percocet) 5-325 mg tablet Take 1 tablet by mouth every 6 hours if needed.      predniSONE (Deltasone) 20 mg tablet Take 1 tablet (20 mg) by mouth once daily. (Patient not taking: Reported on 1/15/2025) 7 tablet 0    rizatriptan (Maxalt) 10 mg tablet Take by mouth. TAKE 1 TABLET BY MOUTH AS NEEDED FOR MIGRAINE HEADACHE (SEE ADMINISTRATION INSTRUCTIONS). MAY REPEAT IN 2 HOURS IF NEEDED.      tirzepatide (Mounjaro) 5 mg/0.5 mL pen injector Inject 5 mg under the skin 1 (one) time per week. 2 mL 11    traMADol (Ultram) 50 mg tablet Take 1 tablet (50 mg) by mouth 3 times a day.      triamterene-hydrochlorothiazid (Dyazide) 37.5-25 mg capsule Take 1 capsule by mouth once daily. 90 capsule 3     No current facility-administered medications for this visit.       Summary of the labs over the past 6 months:    Lab on 01/06/2025   Component Date Value Ref Range Status    WBC 01/06/2025 7.7  4.4 - 11.3 x10*3/uL Final    nRBC 01/06/2025 0.0  0.0 - 0.0 /100 WBCs Final    RBC 01/06/2025 5.22 (H)  4.00 - 5.20 x10*6/uL Final    Hemoglobin 01/06/2025 14.6  12.0 - 16.0 g/dL Final    Hematocrit 01/06/2025 46.0  36.0 - 46.0 % Final    MCV 01/06/2025 88  80 - 100 fL Final    MCH 01/06/2025 28.0  26.0 -  34.0 pg Final    MCHC 01/06/2025 31.7 (L)  32.0 - 36.0 g/dL Final    RDW 01/06/2025 13.4  11.5 - 14.5 % Final    Platelets 01/06/2025 408  150 - 450 x10*3/uL Final    Neutrophils % 01/06/2025 72.2  40.0 - 80.0 % Final    Immature Granulocytes %, Automated 01/06/2025 0.3  0.0 - 0.9 % Final    Lymphocytes % 01/06/2025 19.9  13.0 - 44.0 % Final    Monocytes % 01/06/2025 6.4  2.0 - 10.0 % Final    Eosinophils % 01/06/2025 0.7  0.0 - 6.0 % Final    Basophils % 01/06/2025 0.5  0.0 - 2.0 % Final    Neutrophils Absolute 01/06/2025 5.55  1.20 - 7.70 x10*3/uL Final    Immature Granulocytes Absolute, Au* 01/06/2025 0.02  0.00 - 0.70 x10*3/uL Final    Lymphocytes Absolute 01/06/2025 1.53  1.20 - 4.80 x10*3/uL Final    Monocytes Absolute 01/06/2025 0.49  0.10 - 1.00 x10*3/uL Final    Eosinophils Absolute 01/06/2025 0.05  0.00 - 0.70 x10*3/uL Final    Basophils Absolute 01/06/2025 0.04  0.00 - 0.10 x10*3/uL Final    Glucose 01/06/2025 182 (H)  74 - 99 mg/dL Final    Sodium 01/06/2025 140  136 - 145 mmol/L Final    Potassium 01/06/2025 4.1  3.5 - 5.3 mmol/L Final    Chloride 01/06/2025 98  98 - 107 mmol/L Final    Bicarbonate 01/06/2025 25  21 - 32 mmol/L Final    Anion Gap 01/06/2025 21 (H)  10 - 20 mmol/L Final    Urea Nitrogen 01/06/2025 18  6 - 23 mg/dL Final    Creatinine 01/06/2025 0.65  0.50 - 1.05 mg/dL Final    eGFR 01/06/2025 >90  >60 mL/min/1.73m*2 Final    Calcium 01/06/2025 10.2  8.6 - 10.6 mg/dL Final    Albumin 01/06/2025 4.2  3.4 - 5.0 g/dL Final    Alkaline Phosphatase 01/06/2025 137 (H)  33 - 110 U/L Final    Total Protein 01/06/2025 7.1  6.4 - 8.2 g/dL Final    AST 01/06/2025 30  9 - 39 U/L Final    Bilirubin, Total 01/06/2025 0.8  0.0 - 1.2 mg/dL Final    ALT 01/06/2025 24  7 - 45 U/L Final    Hemoglobin A1C 01/06/2025 8.9 (H)  See comment % Final    Estimated Average Glucose 01/06/2025 209  Not Established mg/dL Final    Cholesterol 01/06/2025 221 (H)  0 - 199 mg/dL Final    HDL-Cholesterol 01/06/2025 55.9   mg/dL Final    Cholesterol/HDL Ratio 01/06/2025 4.0   Final    LDL Calculated 01/06/2025    Final    VLDL 01/06/2025    Final    Triglycerides 01/06/2025 439 (H)  0 - 149 mg/dL Final    Non HDL Cholesterol 01/06/2025 165 (H)  0 - 149 mg/dL Final    Thyroid Stimulating Hormone 01/06/2025 3.60  0.44 - 3.98 mIU/L Final    Sedimentation Rate 01/06/2025 50 (H)  0 - 20 mm/h Final    C-Reactive Protein 01/06/2025 1.94 (H)  <1.00 mg/dL Final    Color, Urine 01/06/2025 Light-Yellow  Light-Yellow, Yellow, Dark-Yellow Final    Appearance, Urine 01/06/2025 Clear  Clear Final    Specific Gravity, Urine 01/06/2025 1.039 (N)  1.005 - 1.035 Final    pH, Urine 01/06/2025 5.5  5.0, 5.5, 6.0, 6.5, 7.0, 7.5, 8.0 Final    Protein, Urine 01/06/2025 NEGATIVE  NEGATIVE, 10 (TRACE), 20 (TRACE) mg/dL Final    Glucose, Urine 01/06/2025 OVER (4+) (A)  Normal mg/dL Final    Blood, Urine 01/06/2025 NEGATIVE  NEGATIVE Final    Ketones, Urine 01/06/2025 NEGATIVE  NEGATIVE mg/dL Final    Bilirubin, Urine 01/06/2025 NEGATIVE  NEGATIVE Final    Urobilinogen, Urine 01/06/2025 Normal  Normal mg/dL Final    Nitrite, Urine 01/06/2025 NEGATIVE  NEGATIVE Final    Leukocyte Esterase, Urine 01/06/2025 NEGATIVE  NEGATIVE Final    Extra Tube 01/06/2025 Hold for add-ons.   Final   Lab on 09/11/2024   Component Date Value Ref Range Status    Color, Urine 09/11/2024 Light-Yellow  Light-Yellow, Yellow, Dark-Yellow Final    Appearance, Urine 09/11/2024 Clear  Clear Final    Specific Gravity, Urine 09/11/2024 1.034  1.005 - 1.035 Final    pH, Urine 09/11/2024 5.5  5.0, 5.5, 6.0, 6.5, 7.0, 7.5, 8.0 Final    Protein, Urine 09/11/2024 NEGATIVE  NEGATIVE, 10 (TRACE), 20 (TRACE) mg/dL Final    Glucose, Urine 09/11/2024 OVER (4+) (A)  Normal mg/dL Final    Blood, Urine 09/11/2024 NEGATIVE  NEGATIVE Final    Ketones, Urine 09/11/2024 10 (1+) (A)  NEGATIVE mg/dL Final    Bilirubin, Urine 09/11/2024 NEGATIVE  NEGATIVE Final    Urobilinogen, Urine 09/11/2024 Normal  Normal  mg/dL Final    Nitrite, Urine 09/11/2024 NEGATIVE  NEGATIVE Final    Leukocyte Esterase, Urine 09/11/2024 NEGATIVE  NEGATIVE Final    Extra Tube 09/11/2024 Hold for add-ons.   Final       Scratch skin tests were positive to: tree pollen  Remainder were negative  No allergies to dust mites, molds, cats, or dogs.  Fruit panel and individual vinay tests were negative.      Assessment/Plan   Diagnoses and all orders for this visit:  Allergic rhinitis due to pollen, unspecified seasonality  -     azelastine (Astelin) 137 mcg (0.1 %) nasal spray; Administer 2 sprays into each nostril 2 times a day. 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 We discussed the results of the testing. Also, we started to focus on treating your problem and we reviewed the management plan.    The vinay is an allergy/sensitivity - likely cross reactive with your pollen allergy.   Typically these reactions are LOCAL and can be treated with antihistamines.  Of course, avoidance is very important.     For quick relief of allergic rhinitis and conjunctivitis, will use azelastine nasal.    We reviewed how to use the nasal spray. Proper usage is important in order to deliver the medicine to where it needs to work and to avoid side effects or nasal irritation. Bend your head down looking at your toes because the nasal passageway goes straight back, not up. Point the spray at 10 and 2 o'clock and breath in through your nose just slightly. You will want to avoid the center part of the nose (septum). It may help to look in the mirror to check your technique. The medicine should not drip out but it is normal to taste it after several minutes.      2. Continue to avoid the nsaids and cephalosporins; she is ok with penicillins.    3. I recommend checking for contact dermatitis with patch testing. The  patient understands that the patch test cannot become wet so showers and heavy exercise need to be avoided. Also, please do not use any topical or oral steroids like prednisone as this can interfere with the testing results. But antihistamines for itching can be used. You should bring in any of your own creams, products, etc. that you would like us to also patch test.     If the lesion changes, don't wait - get it biopsied; otherwise, I agree it can wait (up to a few months)    Stoney Gonzalez MD

## 2025-04-01 ENCOUNTER — APPOINTMENT (OUTPATIENT)
Dept: PRIMARY CARE | Facility: CLINIC | Age: 50
End: 2025-04-01
Payer: COMMERCIAL

## 2025-04-01 ENCOUNTER — APPOINTMENT (OUTPATIENT)
Dept: ALLERGY | Facility: CLINIC | Age: 50
End: 2025-04-01
Payer: COMMERCIAL

## 2025-04-01 VITALS
WEIGHT: 209 LBS | HEART RATE: 87 BPM | RESPIRATION RATE: 16 BRPM | BODY MASS INDEX: 39.46 KG/M2 | OXYGEN SATURATION: 96 % | HEIGHT: 61 IN | SYSTOLIC BLOOD PRESSURE: 116 MMHG | TEMPERATURE: 97.8 F | DIASTOLIC BLOOD PRESSURE: 84 MMHG

## 2025-04-01 DIAGNOSIS — E11.9 TYPE 2 DIABETES MELLITUS WITHOUT COMPLICATION, WITHOUT LONG-TERM CURRENT USE OF INSULIN: Primary | ICD-10-CM

## 2025-04-01 PROCEDURE — 1036F TOBACCO NON-USER: CPT | Performed by: FAMILY MEDICINE

## 2025-04-01 PROCEDURE — 3074F SYST BP LT 130 MM HG: CPT | Performed by: FAMILY MEDICINE

## 2025-04-01 PROCEDURE — 3008F BODY MASS INDEX DOCD: CPT | Performed by: FAMILY MEDICINE

## 2025-04-01 PROCEDURE — 3052F HG A1C>EQUAL 8.0%<EQUAL 9.0%: CPT | Performed by: FAMILY MEDICINE

## 2025-04-01 PROCEDURE — 3079F DIAST BP 80-89 MM HG: CPT | Performed by: FAMILY MEDICINE

## 2025-04-01 PROCEDURE — 99213 OFFICE O/P EST LOW 20 MIN: CPT | Performed by: FAMILY MEDICINE

## 2025-04-01 RX ORDER — TIRZEPATIDE 7.5 MG/.5ML
7.5 INJECTION, SOLUTION SUBCUTANEOUS WEEKLY
Qty: 2 ML | Refills: 11 | Status: SHIPPED | OUTPATIENT
Start: 2025-04-01 | End: 2025-04-01 | Stop reason: SDUPTHER

## 2025-04-01 RX ORDER — TIRZEPATIDE 7.5 MG/.5ML
7.5 INJECTION, SOLUTION SUBCUTANEOUS WEEKLY
Qty: 2 ML | Refills: 11 | Status: SHIPPED | OUTPATIENT
Start: 2025-04-01 | End: 2026-04-01

## 2025-04-01 ASSESSMENT — PATIENT HEALTH QUESTIONNAIRE - PHQ9
2. FEELING DOWN, DEPRESSED OR HOPELESS: NOT AT ALL
SUM OF ALL RESPONSES TO PHQ9 QUESTIONS 1 AND 2: 0
1. LITTLE INTEREST OR PLEASURE IN DOING THINGS: NOT AT ALL

## 2025-04-01 NOTE — PROGRESS NOTES
Subjective   Richard Contreras is a 49 y.o. female who presents for Weight Management.  HPI    Here for f/up on mounjaro 5 mg   Sugar much better but does not check sugar just feels better less polyuria/polydipsia   Early satiety but surprised has not lost more weight.   1 episode of vomiting  Mostly regular Bms.     Still ltd L thumb ROM despite surg. Doing OT     Current Outpatient Medications on File Prior to Visit   Medication Sig Dispense Refill    acetaminophen (Tylenol) 325 mg tablet Take 1 tablet (325 mg) by mouth if needed.      atorvastatin (Lipitor) 40 mg tablet TAKE ONE TABLET BY MOUTH ONCE DAILY AT BEDTIME 90 tablet 3    azelastine (Astelin) 137 mcg (0.1 %) nasal spray Administer 2 sprays into each nostril 2 times a day. Use in each nostril as directed 30 mL 5    buPROPion XL (Wellbutrin XL) 150 mg 24 hr tablet TAKE ONE TABLET BY MOUTH once DAILY 90 tablet 3    empagliflozin (Jardiance) 25 mg Take 1 tablet (25 mg) by mouth once daily. 90 tablet 3    EPINEPHrine 0.3 mg/0.3 mL injection syringe INJECT 0.3 ML (0.3 MG) INTO THE MUSCLE 1 TIME IF NEEDED FOR ANAPHYLAXIS. INJECT INTO THE UPPER LEG. CALL 911 AFTER USE. 1 each 1    estradiol (Estrace) 0.5 mg tablet TAKE ONE TABLET BY MOUTH ONCE DAILY 90 tablet 3    ezetimibe (Zetia) 10 mg tablet TAKE ONE TABLET BY MOUTH ONCE DAILY AT BEDTIME. 90 tablet 3    FLUoxetine (PROzac) 10 mg capsule TAKE ONE CAPSULE BY MOUTH ONCE DAILY 90 capsule 3    gabapentin (Neurontin) 300 mg capsule Take 2 capsules (600 mg) by mouth 3 times a day. 180 capsule 11    hydrOXYzine HCL (Atarax) 10 mg tablet Take 1-2 tablets (10-20 mg) by mouth 2 times a day as needed for anxiety. 180 tablet 1    hyoscyamine (Levsin) 0.125 mg tablet Take 1 tablet (0.125 mg) by mouth every 4 hours if needed for cramping for up to 10 days. 30 tablet 2    mesalamine (Asacol) 800 mg EC tablet Take 2 tablets (1,600 mg) by mouth 2 times a day. Do not crush, chew, or split. 120 tablet 10    naratriptan  "(Amerge) 2.5 mg tablet Take 1 tablet (2.5 mg) by mouth 1 time if needed.      Nurtec ODT 75 mg tablet,disintegrating DISSOLVE ONE TABLET IN MOUTH once DAILY as needed for migraines. 8 tablet 3    omeprazole (PriLOSEC) 40 mg DR capsule Take 1 capsule (40 mg) by mouth once daily in the morning. Take before meals. Do not crush or chew. 90 capsule 3    ondansetron (Zofran) 4 mg tablet Take 1 tablet (4 mg) by mouth every 6 hours if needed for nausea. 20 tablet 3    oxyCODONE-acetaminophen (Percocet) 5-325 mg tablet Take 1 tablet by mouth every 6 hours if needed.      rizatriptan (Maxalt) 10 mg tablet Take by mouth. TAKE 1 TABLET BY MOUTH AS NEEDED FOR MIGRAINE HEADACHE (SEE ADMINISTRATION INSTRUCTIONS). MAY REPEAT IN 2 HOURS IF NEEDED.      traMADol (Ultram) 50 mg tablet Take 1 tablet (50 mg) by mouth 3 times a day.      triamterene-hydrochlorothiazid (Dyazide) 37.5-25 mg capsule Take 1 capsule by mouth once daily. 90 capsule 3    [DISCONTINUED] tirzepatide (Mounjaro) 5 mg/0.5 mL pen injector Inject 5 mg under the skin 1 (one) time per week. 2 mL 11    methocarbamol (Robaxin) 500 mg tablet Take 1-2 tabs every 8 hours as needed for spasms (Patient not taking: Reported on 4/1/2025) 60 tablet 2    predniSONE (Deltasone) 20 mg tablet Take 1 tablet (20 mg) by mouth once daily. (Patient not taking: Reported on 4/1/2025) 7 tablet 0     No current facility-administered medications on file prior to visit.                  Objective   /84   Pulse 87   Temp 36.6 °C (97.8 °F)   Resp 16   Ht 1.549 m (5' 1\")   Wt 94.8 kg (209 lb)   SpO2 96%   BMI 39.49 kg/m²    Physical Exam  General: NAD  HEENT:NCAT, PERRLA, nml OP  Neck: no cervical CAITLYN  Heart: RRR no murmur, no edema   Lungs: CTA b/l, no wheeze or rhonchi   GI: abd soft, nontender, nondistended.   MSK: no c/c/e. nml gait   Ltd L thumb ROM   Skin: warm and dry  Psych: cooperative, appropriate affect  Neuro: speech clear. A&Ox3  Assessment/Plan   Problem List Items " Addressed This Visit       Diabetes mellitus type 2, uncomplicated - Primary  Overall improved w mounjaro 5 mg x 6 wk   INC dose to 7.5 mg   F/up 1 mo labs prior     Relevant Medications    tirzepatide (Mounjaro) 7.5 mg/0.5 mL pen injector    L thumb pain: sp surgery. Doing PT. Per [facility]

## 2025-04-03 ENCOUNTER — APPOINTMENT (OUTPATIENT)
Dept: ALLERGY | Facility: CLINIC | Age: 50
End: 2025-04-03
Payer: COMMERCIAL

## 2025-04-04 ENCOUNTER — APPOINTMENT (OUTPATIENT)
Dept: ALLERGY | Facility: CLINIC | Age: 50
End: 2025-04-04
Payer: COMMERCIAL

## 2025-05-05 ENCOUNTER — APPOINTMENT (OUTPATIENT)
Dept: PRIMARY CARE | Facility: CLINIC | Age: 50
End: 2025-05-05
Payer: COMMERCIAL

## 2025-05-05 VITALS
TEMPERATURE: 98.2 F | RESPIRATION RATE: 16 BRPM | HEART RATE: 78 BPM | WEIGHT: 207 LBS | OXYGEN SATURATION: 98 % | SYSTOLIC BLOOD PRESSURE: 132 MMHG | DIASTOLIC BLOOD PRESSURE: 84 MMHG | HEIGHT: 61 IN | BODY MASS INDEX: 39.08 KG/M2

## 2025-05-05 DIAGNOSIS — R11.0 NAUSEA: ICD-10-CM

## 2025-05-05 DIAGNOSIS — E11.9 TYPE 2 DIABETES MELLITUS WITHOUT COMPLICATION, WITHOUT LONG-TERM CURRENT USE OF INSULIN: Primary | ICD-10-CM

## 2025-05-05 DIAGNOSIS — F41.9 ANXIETY: ICD-10-CM

## 2025-05-05 PROCEDURE — 3075F SYST BP GE 130 - 139MM HG: CPT | Performed by: FAMILY MEDICINE

## 2025-05-05 PROCEDURE — 3079F DIAST BP 80-89 MM HG: CPT | Performed by: FAMILY MEDICINE

## 2025-05-05 PROCEDURE — 1036F TOBACCO NON-USER: CPT | Performed by: FAMILY MEDICINE

## 2025-05-05 PROCEDURE — 3008F BODY MASS INDEX DOCD: CPT | Performed by: FAMILY MEDICINE

## 2025-05-05 PROCEDURE — 99214 OFFICE O/P EST MOD 30 MIN: CPT | Performed by: FAMILY MEDICINE

## 2025-05-05 PROCEDURE — 3052F HG A1C>EQUAL 8.0%<EQUAL 9.0%: CPT | Performed by: FAMILY MEDICINE

## 2025-05-05 RX ORDER — ONDANSETRON 4 MG/1
4 TABLET, FILM COATED ORAL EVERY 6 HOURS PRN
Qty: 20 TABLET | Refills: 3 | Status: SHIPPED | OUTPATIENT
Start: 2025-05-05

## 2025-05-05 RX ORDER — HYDROXYZINE HYDROCHLORIDE 10 MG/1
10-20 TABLET, FILM COATED ORAL 2 TIMES DAILY PRN
Qty: 180 TABLET | Refills: 3 | Status: SHIPPED | OUTPATIENT
Start: 2025-05-05

## 2025-05-05 NOTE — PROGRESS NOTES
"Subjective   Richard Contreras is a 49 y.o. female who presents for Follow-up.  HPI    Mounjaro has SE for 4 d. Dec appetite. Feels bloated and nauseated which was not better at the 2.5 mg dose.   Urinary issues are better.   Zofran helps w nausea     Low dose hydroxyzine helpful for anxiety     Started Docusate for constipation     Medications Ordered Prior to Encounter[1]               Objective   /84   Pulse 78   Temp 36.8 °C (98.2 °F)   Resp 16   Ht 1.549 m (5' 1\")   Wt 93.9 kg (207 lb)   SpO2 98%   BMI 39.11 kg/m²    Physical Exam  General: NAD  HEENT:NCAT, PERRLA, nml OP  Neck: no cervical CAITLYN  Heart: RRR no murmur, no edema   Lungs: CTA b/l, no wheeze or rhonchi   GI: abd soft, nontender, nondistended.   MSK: no c/c/e. nml gait   Skin: warm and dry  Psych: cooperative, appropriate affect  Neuro: speech clear. A&Ox3  Assessment/Plan   Problem List Items Addressed This Visit       Anxiety  Controlled w prn hydroxyzine 10, refilled       Relevant Medications    hydrOXYzine HCL (Atarax) 10 mg tablet    Diabetes mellitus type 2, uncomplicated - Primary  Mounjaro intol   CHANGE to ozempic 0.25, samples provided  Labs/Ualb       Relevant Orders    Albumin-Creatinine Ratio, Urine Random     Other Visit Diagnoses         Nausea      D/t GLP1, refilled zofran to use prn  Hopefully ozempic will be better tolerated than mounjaro and need less zofran       Relevant Medications    ondansetron (Zofran) 4 mg tablet    L hand pain: sp surg. Making gradual progress w ROM but slow                  [1]   Current Outpatient Medications on File Prior to Visit   Medication Sig Dispense Refill    acetaminophen (Tylenol) 325 mg tablet Take 1 tablet (325 mg) by mouth if needed.      atorvastatin (Lipitor) 40 mg tablet TAKE ONE TABLET BY MOUTH ONCE DAILY AT BEDTIME 90 tablet 3    azelastine (Astelin) 137 mcg (0.1 %) nasal spray Administer 2 sprays into each nostril 2 times a day. Use in each nostril as directed 30 mL 5 "    buPROPion XL (Wellbutrin XL) 150 mg 24 hr tablet TAKE ONE TABLET BY MOUTH once DAILY 90 tablet 3    empagliflozin (Jardiance) 25 mg Take 1 tablet (25 mg) by mouth once daily. 90 tablet 3    EPINEPHrine 0.3 mg/0.3 mL injection syringe INJECT 0.3 ML (0.3 MG) INTO THE MUSCLE 1 TIME IF NEEDED FOR ANAPHYLAXIS. INJECT INTO THE UPPER LEG. CALL 911 AFTER USE. 1 each 1    estradiol (Estrace) 0.5 mg tablet TAKE ONE TABLET BY MOUTH ONCE DAILY 90 tablet 3    ezetimibe (Zetia) 10 mg tablet TAKE ONE TABLET BY MOUTH ONCE DAILY AT BEDTIME. 90 tablet 3    FLUoxetine (PROzac) 10 mg capsule TAKE ONE CAPSULE BY MOUTH ONCE DAILY 90 capsule 3    gabapentin (Neurontin) 300 mg capsule Take 2 capsules (600 mg) by mouth 3 times a day. 180 capsule 11    hyoscyamine (Levsin) 0.125 mg tablet Take 1 tablet (0.125 mg) by mouth every 4 hours if needed for cramping for up to 10 days. 30 tablet 2    mesalamine (Asacol) 800 mg EC tablet Take 2 tablets (1,600 mg) by mouth 2 times a day. Do not crush, chew, or split. 120 tablet 10    naratriptan (Amerge) 2.5 mg tablet Take 1 tablet (2.5 mg) by mouth 1 time if needed.      Nurtec ODT 75 mg tablet,disintegrating DISSOLVE ONE TABLET IN MOUTH once DAILY as needed for migraines. 8 tablet 3    omeprazole (PriLOSEC) 40 mg DR capsule Take 1 capsule (40 mg) by mouth once daily in the morning. Take before meals. Do not crush or chew. 90 capsule 3    oxyCODONE-acetaminophen (Percocet) 5-325 mg tablet Take 1 tablet by mouth every 6 hours if needed.      rizatriptan (Maxalt) 10 mg tablet Take by mouth. TAKE 1 TABLET BY MOUTH AS NEEDED FOR MIGRAINE HEADACHE (SEE ADMINISTRATION INSTRUCTIONS). MAY REPEAT IN 2 HOURS IF NEEDED.      traMADol (Ultram) 50 mg tablet Take 1 tablet (50 mg) by mouth 3 times a day.      triamterene-hydrochlorothiazid (Dyazide) 37.5-25 mg capsule Take 1 capsule by mouth once daily. 90 capsule 3    [DISCONTINUED] hydrOXYzine HCL (Atarax) 10 mg tablet Take 1-2 tablets (10-20 mg) by mouth 2  times a day as needed for anxiety. 180 tablet 1    [DISCONTINUED] ondansetron (Zofran) 4 mg tablet Take 1 tablet (4 mg) by mouth every 6 hours if needed for nausea. 20 tablet 3    [DISCONTINUED] tirzepatide (Mounjaro) 5 mg/0.5 mL pen injector Inject 5 mg under the skin 1 (one) time per week. 2 mL 11    methocarbamol (Robaxin) 500 mg tablet Take 1-2 tabs every 8 hours as needed for spasms (Patient not taking: Reported on 5/5/2025) 60 tablet 2    [DISCONTINUED] predniSONE (Deltasone) 20 mg tablet Take 1 tablet (20 mg) by mouth once daily. (Patient not taking: Reported on 4/1/2025) 7 tablet 0    [DISCONTINUED] tirzepatide (Mounjaro) 2.5 mg/0.5 mL pen injector Inject 2.5 mg under the skin 1 (one) time per week. (Patient not taking: Reported on 5/5/2025) 2 mL 11     No current facility-administered medications on file prior to visit.

## 2025-06-11 ENCOUNTER — APPOINTMENT (OUTPATIENT)
Dept: PRIMARY CARE | Facility: CLINIC | Age: 50
End: 2025-06-11
Payer: COMMERCIAL

## 2025-06-11 VITALS
HEIGHT: 61 IN | RESPIRATION RATE: 16 BRPM | TEMPERATURE: 98.2 F | OXYGEN SATURATION: 96 % | BODY MASS INDEX: 36.82 KG/M2 | DIASTOLIC BLOOD PRESSURE: 78 MMHG | SYSTOLIC BLOOD PRESSURE: 116 MMHG | HEART RATE: 93 BPM | WEIGHT: 195 LBS

## 2025-06-11 DIAGNOSIS — E11.9 TYPE 2 DIABETES MELLITUS WITHOUT COMPLICATION, WITHOUT LONG-TERM CURRENT USE OF INSULIN: Primary | ICD-10-CM

## 2025-06-11 DIAGNOSIS — M25.561 ACUTE PAIN OF RIGHT KNEE: ICD-10-CM

## 2025-06-11 PROCEDURE — 3052F HG A1C>EQUAL 8.0%<EQUAL 9.0%: CPT | Performed by: FAMILY MEDICINE

## 2025-06-11 PROCEDURE — 1036F TOBACCO NON-USER: CPT | Performed by: FAMILY MEDICINE

## 2025-06-11 PROCEDURE — 3008F BODY MASS INDEX DOCD: CPT | Performed by: FAMILY MEDICINE

## 2025-06-11 PROCEDURE — 3078F DIAST BP <80 MM HG: CPT | Performed by: FAMILY MEDICINE

## 2025-06-11 PROCEDURE — 3074F SYST BP LT 130 MM HG: CPT | Performed by: FAMILY MEDICINE

## 2025-06-11 PROCEDURE — 99214 OFFICE O/P EST MOD 30 MIN: CPT | Performed by: FAMILY MEDICINE

## 2025-06-11 RX ORDER — TIRZEPATIDE 7.5 MG/.5ML
INJECTION, SOLUTION SUBCUTANEOUS WEEKLY
COMMUNITY
Start: 2025-05-22

## 2025-06-11 NOTE — PROGRESS NOTES
"Subjective   Richard Contreras is a 49 y.o. female who presents for Follow-up.  HPI    Nausea overall better w mounjaro 7.5, will be nauseated the first day then gets better.   Will feel inc hunger and inc urination toward the end of the week.   Trying to eat healthier     R knee pain after stepping in sand, heard a small pop. Worse at night when sleeping and waking up in pain. + swelling. Started over a week ago.     Medications Ordered Prior to Encounter[1]               Objective   /78   Pulse 93   Temp 36.8 °C (98.2 °F)   Resp 16   Ht 1.549 m (5' 1\")   Wt 88.5 kg (195 lb)   SpO2 96%   BMI 36.84 kg/m²    Physical Exam  General: NAD  HEENT:NCAT, PERRLA, nml OP  Neck: no cervical CAITLYN  Heart: RRR no murmur, no edema   Lungs: CTA b/l, no wheeze or rhonchi   GI: abd soft, nontender, nondistended.   MSK: no c/c/e. nml gait   R knee clicking, nml ROM, posterior knee pain   Neg lig testing   Skin: warm and dry  Psych: cooperative, appropriate affect  Neuro: speech clear. A&Ox3  Assessment/Plan   Problem List Items Addressed This Visit       Diabetes mellitus type 2, uncomplicated - Primary  Tolerating mounjaro 7.5 mostly well   Check labs/Ualb   F/up 2 mo   Will inc mounjaro if tolerating well in 1-2 mo        Other Visit Diagnoses         Acute pain of right knee      ?meniscal injury   Rec to f/up w [facility]                  [1]   Current Outpatient Medications on File Prior to Visit   Medication Sig Dispense Refill    acetaminophen (Tylenol) 325 mg tablet Take 1 tablet (325 mg) by mouth if needed.      atorvastatin (Lipitor) 40 mg tablet TAKE ONE TABLET BY MOUTH ONCE DAILY AT BEDTIME 90 tablet 3    azelastine (Astelin) 137 mcg (0.1 %) nasal spray Administer 2 sprays into each nostril 2 times a day. Use in each nostril as directed 30 mL 5    buPROPion XL (Wellbutrin XL) 150 mg 24 hr tablet TAKE ONE TABLET BY MOUTH once DAILY 90 tablet 3    empagliflozin (Jardiance) 25 mg Take 1 tablet (25 mg) by " mouth once daily. 90 tablet 3    EPINEPHrine 0.3 mg/0.3 mL injection syringe INJECT 0.3 ML (0.3 MG) INTO THE MUSCLE 1 TIME IF NEEDED FOR ANAPHYLAXIS. INJECT INTO THE UPPER LEG. CALL 911 AFTER USE. 1 each 1    estradiol (Estrace) 0.5 mg tablet TAKE ONE TABLET BY MOUTH ONCE DAILY 90 tablet 3    ezetimibe (Zetia) 10 mg tablet TAKE ONE TABLET BY MOUTH ONCE DAILY AT BEDTIME. 90 tablet 3    FLUoxetine (PROzac) 10 mg capsule TAKE ONE CAPSULE BY MOUTH ONCE DAILY 90 capsule 3    gabapentin (Neurontin) 300 mg capsule Take 2 capsules (600 mg) by mouth 3 times a day. 180 capsule 11    hydrOXYzine HCL (Atarax) 10 mg tablet Take 1-2 tablets (10-20 mg) by mouth 2 times a day as needed for anxiety. 180 tablet 3    hyoscyamine (Levsin) 0.125 mg tablet Take 1 tablet (0.125 mg) by mouth every 4 hours if needed for cramping for up to 10 days. 30 tablet 2    mesalamine (Asacol) 800 mg EC tablet Take 2 tablets (1,600 mg) by mouth 2 times a day. Do not crush, chew, or split. 120 tablet 10    methocarbamol (Robaxin) 500 mg tablet Take 1-2 tabs every 8 hours as needed for spasms 60 tablet 2    Mounjaro 7.5 mg/0.5 mL pen injector 1 (one) time per week.      naratriptan (Amerge) 2.5 mg tablet Take 1 tablet (2.5 mg) by mouth 1 time if needed.      Nurtec ODT 75 mg tablet,disintegrating DISSOLVE ONE TABLET IN MOUTH once DAILY as needed for migraines. 8 tablet 3    omeprazole (PriLOSEC) 40 mg DR capsule Take 1 capsule (40 mg) by mouth once daily in the morning. Take before meals. Do not crush or chew. 90 capsule 3    ondansetron (Zofran) 4 mg tablet Take 1 tablet (4 mg) by mouth every 6 hours if needed for nausea. 20 tablet 3    oxyCODONE-acetaminophen (Percocet) 5-325 mg tablet Take 1 tablet by mouth every 6 hours if needed.      rizatriptan (Maxalt) 10 mg tablet Take by mouth. TAKE 1 TABLET BY MOUTH AS NEEDED FOR MIGRAINE HEADACHE (SEE ADMINISTRATION INSTRUCTIONS). MAY REPEAT IN 2 HOURS IF NEEDED.      traMADol (Ultram) 50 mg tablet Take 1  tablet (50 mg) by mouth 3 times a day.      triamterene-hydrochlorothiazid (Dyazide) 37.5-25 mg capsule Take 1 capsule by mouth once daily. 90 capsule 3     No current facility-administered medications on file prior to visit.

## 2025-06-12 LAB
ALBUMIN SERPL-MCNC: 4.5 G/DL (ref 3.6–5.1)
ALBUMIN/CREAT UR: 9 MG/G CREAT
ALP SERPL-CCNC: 135 U/L (ref 31–125)
ALT SERPL-CCNC: 24 U/L (ref 6–29)
ANION GAP SERPL CALCULATED.4IONS-SCNC: 15 MMOL/L (CALC) (ref 7–17)
AST SERPL-CCNC: 26 U/L (ref 10–35)
BASOPHILS # BLD AUTO: 39 CELLS/UL (ref 0–200)
BASOPHILS NFR BLD AUTO: 0.6 %
BILIRUB SERPL-MCNC: 1.2 MG/DL (ref 0.2–1.2)
BUN SERPL-MCNC: 13 MG/DL (ref 7–25)
CALCIUM SERPL-MCNC: 10 MG/DL (ref 8.6–10.2)
CHLORIDE SERPL-SCNC: 97 MMOL/L (ref 98–110)
CHOLEST SERPL-MCNC: 242 MG/DL
CHOLEST/HDLC SERPL: 4.7 (CALC)
CO2 SERPL-SCNC: 24 MMOL/L (ref 20–32)
CREAT SERPL-MCNC: 0.8 MG/DL (ref 0.5–0.99)
CREAT UR-MCNC: 119 MG/DL (ref 20–275)
EGFRCR SERPLBLD CKD-EPI 2021: 90 ML/MIN/1.73M2
EOSINOPHIL # BLD AUTO: 39 CELLS/UL (ref 15–500)
EOSINOPHIL NFR BLD AUTO: 0.6 %
ERYTHROCYTE [DISTWIDTH] IN BLOOD BY AUTOMATED COUNT: 13.9 % (ref 11–15)
EST. AVERAGE GLUCOSE BLD GHB EST-MCNC: 154 MG/DL
EST. AVERAGE GLUCOSE BLD GHB EST-SCNC: 8.5 MMOL/L
GLUCOSE SERPL-MCNC: 122 MG/DL (ref 65–99)
HBA1C MFR BLD: 7 %
HCT VFR BLD AUTO: 45.4 % (ref 35–45)
HDLC SERPL-MCNC: 51 MG/DL
HGB BLD-MCNC: 15.1 G/DL (ref 11.7–15.5)
LDLC SERPL CALC-MCNC: ABNORMAL MG/DL
LYMPHOCYTES # BLD AUTO: 1281 CELLS/UL (ref 850–3900)
LYMPHOCYTES NFR BLD AUTO: 19.7 %
MCH RBC QN AUTO: 28.8 PG (ref 27–33)
MCHC RBC AUTO-ENTMCNC: 33.3 G/DL (ref 32–36)
MCV RBC AUTO: 86.6 FL (ref 80–100)
MICROALBUMIN UR-MCNC: 1.1 MG/DL
MONOCYTES # BLD AUTO: 540 CELLS/UL (ref 200–950)
MONOCYTES NFR BLD AUTO: 8.3 %
NEUTROPHILS # BLD AUTO: 4602 CELLS/UL (ref 1500–7800)
NEUTROPHILS NFR BLD AUTO: 70.8 %
NONHDLC SERPL-MCNC: 191 MG/DL (CALC)
PLATELET # BLD AUTO: 361 THOUSAND/UL (ref 140–400)
PMV BLD REES-ECKER: 9.8 FL (ref 7.5–12.5)
POTASSIUM SERPL-SCNC: 3.7 MMOL/L (ref 3.5–5.3)
PROT SERPL-MCNC: 7.5 G/DL (ref 6.1–8.1)
RBC # BLD AUTO: 5.24 MILLION/UL (ref 3.8–5.1)
SODIUM SERPL-SCNC: 136 MMOL/L (ref 135–146)
TRIGL SERPL-MCNC: 421 MG/DL
WBC # BLD AUTO: 6.5 THOUSAND/UL (ref 3.8–10.8)

## 2025-06-13 ENCOUNTER — TELEPHONE (OUTPATIENT)
Dept: PRIMARY CARE | Facility: CLINIC | Age: 50
End: 2025-06-13
Payer: COMMERCIAL

## 2025-06-13 DIAGNOSIS — E78.1 HYPERTRIGLYCERIDEMIA: Primary | ICD-10-CM

## 2025-06-13 DIAGNOSIS — E11.9 TYPE 2 DIABETES MELLITUS WITHOUT COMPLICATION, WITHOUT LONG-TERM CURRENT USE OF INSULIN: ICD-10-CM

## 2025-06-13 RX ORDER — FENOFIBRATE 145 MG/1
145 TABLET, FILM COATED ORAL DAILY
Qty: 30 TABLET | Refills: 11 | Status: SHIPPED | OUTPATIENT
Start: 2025-06-13 | End: 2026-06-13

## 2025-07-11 DIAGNOSIS — E11.9 TYPE 2 DIABETES MELLITUS WITHOUT COMPLICATION, WITHOUT LONG-TERM CURRENT USE OF INSULIN: ICD-10-CM

## 2025-07-13 ENCOUNTER — PATIENT MESSAGE (OUTPATIENT)
Dept: PRIMARY CARE | Facility: CLINIC | Age: 50
End: 2025-07-13
Payer: COMMERCIAL

## 2025-07-13 DIAGNOSIS — E11.9 TYPE 2 DIABETES MELLITUS WITHOUT COMPLICATION, WITHOUT LONG-TERM CURRENT USE OF INSULIN: ICD-10-CM

## 2025-07-13 DIAGNOSIS — G43.009 MIGRAINE WITHOUT AURA AND WITHOUT STATUS MIGRAINOSUS, NOT INTRACTABLE: Primary | ICD-10-CM

## 2025-07-14 RX ORDER — RIZATRIPTAN BENZOATE 10 MG/1
10 TABLET ORAL ONCE AS NEEDED
Qty: 9 TABLET | Refills: 3 | Status: SHIPPED | OUTPATIENT
Start: 2025-07-14

## 2025-07-14 RX ORDER — EMPAGLIFLOZIN 25 MG/1
25 TABLET, FILM COATED ORAL DAILY
Qty: 90 TABLET | Refills: 3 | Status: SHIPPED | OUTPATIENT
Start: 2025-07-14

## 2025-07-16 ENCOUNTER — TELEPHONE (OUTPATIENT)
Dept: PRIMARY CARE | Facility: CLINIC | Age: 50
End: 2025-07-16
Payer: COMMERCIAL

## 2025-07-16 DIAGNOSIS — E11.9 TYPE 2 DIABETES MELLITUS WITHOUT COMPLICATION, WITHOUT LONG-TERM CURRENT USE OF INSULIN: Primary | ICD-10-CM

## 2025-07-16 RX ORDER — DAPAGLIFLOZIN 10 MG/1
10 TABLET, FILM COATED ORAL DAILY
Qty: 30 TABLET | Refills: 11 | Status: SHIPPED | OUTPATIENT
Start: 2025-07-16 | End: 2026-08-20

## 2025-07-16 NOTE — TELEPHONE ENCOUNTER
Pls let pt know that ins is changing coverage to farxiga and not covering jardiance.  These medications work similarly so no real change w switching

## 2025-07-17 DIAGNOSIS — E11.9 TYPE 2 DIABETES MELLITUS WITHOUT COMPLICATION, WITHOUT LONG-TERM CURRENT USE OF INSULIN: Primary | ICD-10-CM

## 2025-07-18 RX ORDER — TIRZEPATIDE 7.5 MG/.5ML
7.5 INJECTION, SOLUTION SUBCUTANEOUS WEEKLY
Qty: 3 ML | Refills: 3 | Status: SHIPPED | OUTPATIENT
Start: 2025-07-18

## 2025-07-21 ENCOUNTER — APPOINTMENT (OUTPATIENT)
Dept: PRIMARY CARE | Facility: CLINIC | Age: 50
End: 2025-07-21
Payer: COMMERCIAL

## 2025-07-21 VITALS
SYSTOLIC BLOOD PRESSURE: 104 MMHG | TEMPERATURE: 97.3 F | BODY MASS INDEX: 36.06 KG/M2 | RESPIRATION RATE: 20 BRPM | HEART RATE: 77 BPM | DIASTOLIC BLOOD PRESSURE: 76 MMHG | WEIGHT: 191 LBS | OXYGEN SATURATION: 96 % | HEIGHT: 61 IN

## 2025-07-21 DIAGNOSIS — I10 HYPERTENSION, UNSPECIFIED TYPE: ICD-10-CM

## 2025-07-21 DIAGNOSIS — Z01.818 PREOP EXAM FOR INTERNAL MEDICINE: Primary | ICD-10-CM

## 2025-07-21 LAB
ANION GAP SERPL CALCULATED.4IONS-SCNC: 11 MMOL/L (CALC) (ref 7–17)
BUN SERPL-MCNC: 17 MG/DL (ref 7–25)
BUN/CREAT SERPL: ABNORMAL (CALC) (ref 6–22)
CALCIUM SERPL-MCNC: 10.5 MG/DL (ref 8.6–10.2)
CHLORIDE SERPL-SCNC: 99 MMOL/L (ref 98–110)
CO2 SERPL-SCNC: 26 MMOL/L (ref 20–32)
CREAT SERPL-MCNC: 0.94 MG/DL (ref 0.5–0.99)
EGFRCR SERPLBLD CKD-EPI 2021: 74 ML/MIN/1.73M2
ERYTHROCYTE [DISTWIDTH] IN BLOOD BY AUTOMATED COUNT: 13.2 % (ref 11–15)
GLUCOSE SERPL-MCNC: 106 MG/DL (ref 65–99)
HCT VFR BLD AUTO: 44.1 % (ref 35–45)
HGB BLD-MCNC: 14.5 G/DL (ref 11.7–15.5)
MCH RBC QN AUTO: 29 PG (ref 27–33)
MCHC RBC AUTO-ENTMCNC: 32.9 G/DL (ref 32–36)
MCV RBC AUTO: 88.2 FL (ref 80–100)
PLATELET # BLD AUTO: 392 THOUSAND/UL (ref 140–400)
PMV BLD REES-ECKER: 9.8 FL (ref 7.5–12.5)
POTASSIUM SERPL-SCNC: 4.5 MMOL/L (ref 3.5–5.3)
RBC # BLD AUTO: 5 MILLION/UL (ref 3.8–5.1)
SODIUM SERPL-SCNC: 136 MMOL/L (ref 135–146)
WBC # BLD AUTO: 5.8 THOUSAND/UL (ref 3.8–10.8)

## 2025-07-21 PROCEDURE — 93000 ELECTROCARDIOGRAM COMPLETE: CPT | Performed by: NURSE PRACTITIONER

## 2025-07-21 PROCEDURE — 1036F TOBACCO NON-USER: CPT | Performed by: NURSE PRACTITIONER

## 2025-07-21 PROCEDURE — 3008F BODY MASS INDEX DOCD: CPT | Performed by: NURSE PRACTITIONER

## 2025-07-21 PROCEDURE — 3078F DIAST BP <80 MM HG: CPT | Performed by: NURSE PRACTITIONER

## 2025-07-21 PROCEDURE — 3074F SYST BP LT 130 MM HG: CPT | Performed by: NURSE PRACTITIONER

## 2025-07-21 PROCEDURE — 99214 OFFICE O/P EST MOD 30 MIN: CPT | Performed by: NURSE PRACTITIONER

## 2025-07-21 ASSESSMENT — ENCOUNTER SYMPTOMS
HEMATOLOGIC/LYMPHATIC NEGATIVE: 1
RESPIRATORY NEGATIVE: 1
NECK STIFFNESS: 0
CONSTITUTIONAL NEGATIVE: 1
GASTROINTESTINAL NEGATIVE: 1
EYES NEGATIVE: 1
NECK PAIN: 0
ALLERGIC/IMMUNOLOGIC NEGATIVE: 1
ARTHRALGIAS: 1
JOINT SWELLING: 1
BACK PAIN: 0
CARDIOVASCULAR NEGATIVE: 1
MYALGIAS: 0
ENDOCRINE NEGATIVE: 1

## 2025-07-21 NOTE — PROGRESS NOTES
Subjective   Patient ID: Richard Contreras is a 49 y.o. female who presents for SURGICAL CLEARANCE.      HPI    Richard presents for preadmission testing.  Scheduled with Dr. Jordan at [facility] and [place] to have right knee meniscectomy for right meniscal tear July 25, 2025.    Right knee pain and weakness with weightbearing can be as high as of 7 out of 10.  The pain also increases in the evening when she is sitting relaxing to a 7 out of 10.    Reviewed allergies to medications of Keflex, influenza vaccine, ketorolac, NSAIDs, aspirin and meperidine.    Reviewed past medical history of hypertension, hyperlipidemia, diabetes type II, BMI of 36, elevated liver enzymes, GERD, ulcerative colitis, colonic polyp, anxiety depression, perimenopausal state,     Reviewed family history and is without any negative side effects from anesthesia.  Denies any family history of myocardial infarction, CVA or TIA.  Denies any family history of bleeding or clotting disorders.  Family history of breast cancer, colon cancer and pancreatic cancer    Reviewed social history.  Never tobacco use.  Never vaping use.  Never the cigarette use.  Drinks alcoholic beverages 1-2 servings per month.  Denies illicit drug use.    Reviewed OARRS July 21, 2025.  Current prescription of gabapentin 300 mg on report.  Last oxycodone prescription fill March 21, 2025.    Mounjaro last dose 7/18/2025    No longer on jardiance due to insurance cost.    Farxiga last dose 7/22/2025  Not on NSAIDs or aspirin due to allergy.          Medication Documentation Review Audit       Reviewed by Teresa Koenig MA (Medical Assistant) on 07/21/25 at 0805      Medication Order Taking? Sig Documenting Provider Last Dose Status   acetaminophen (Tylenol) 325 mg tablet [id number]  Take 1 tablet (325 mg) by mouth if needed. Historical Provider, MD  Active   atorvastatin (Lipitor) 40 mg tablet [id number]  TAKE ONE TABLET BY MOUTH ONCE DAILY AT BEDTIME Christelle  Behm, DO  Active   azelastine (Astelin) 137 mcg (0.1 %) nasal spray [id number]  Administer 2 sprays into each nostril 2 times a day. Use in each nostril as directed Stoney Gonzalez MD  Active   buPROPion XL (Wellbutrin XL) 150 mg 24 hr tablet [id number]  TAKE ONE TABLET BY MOUTH once DAILY Brittany Behm, DO  Active   dapagliflozin propanediol (Farxiga) 10 mg tablet [id number]  Take 1 tablet (10 mg) by mouth once daily. Brittany Behm, DO  Active   empagliflozin (Jardiance) 25 mg tablet [id number]  Take 1 tablet (25 mg) by mouth once daily. Brittany Behm, DO  Active   EPINEPHrine 0.3 mg/0.3 mL injection syringe [id number]  INJECT 0.3 ML (0.3 MG) INTO THE MUSCLE 1 TIME IF NEEDED FOR ANAPHYLAXIS. INJECT INTO THE UPPER LEG. CALL 911 AFTER USE. Brittany Behm, DO  Active   estradiol (Estrace) 0.5 mg tablet [id number]  TAKE ONE TABLET BY MOUTH ONCE DAILY Brittany Behm, DO  Active   ezetimibe (Zetia) 10 mg tablet [id number]  TAKE ONE TABLET BY MOUTH ONCE DAILY AT BEDTIME. Brittany Behm, DO  Active   fenofibrate (Tricor) 145 mg tablet [id number]  Take 1 tablet (145 mg) by mouth once daily. Brittany Behm, DO  Active   FLUoxetine (PROzac) 10 mg capsule [id number]  TAKE ONE CAPSULE BY MOUTH ONCE DAILY Brittany Behm, DO  Active   gabapentin (Neurontin) 300 mg capsule [id number]  Take 2 capsules (600 mg) by mouth 3 times a day. Brittany Behm, DO  Active   hydrOXYzine HCL (Atarax) 10 mg tablet [id number]  Take 1-2 tablets (10-20 mg) by mouth 2 times a day as needed for anxiety. Brittany Behm, DO  Active   hyoscyamine (Levsin) 0.125 mg tablet [id number]  Take 1 tablet (0.125 mg) by mouth every 4 hours if needed for cramping for up to 10 days. Brittany Behm, DO   25 2359   Jardiance 25 mg tablet [id number]  TAKE ONE TABLET BY MOUTH ONCE DAILY Christelle Behm, DO  Active   mesalamine (Asacol) 800 mg EC tablet [id number]  Take 2 tablets (1,600 mg) by mouth 2 times a day. Do not crush, chew, or split. Carmine Henderson MD  Active  "  methocarbamol (Robaxin) 500 mg tablet [id number]  Take 1-2 tabs every 8 hours as needed for spasms Christelle Yousif PA-C  Active     Discontinued 07/18/25 0715   Mounjaro 7.5 mg/0.5 mL pen injector [id number]  Inject 7.5 mg under the skin 1 (one) time per week. Brittany Behm, DO  Active   naratriptan (Amerge) 2.5 mg tablet [id number]  Take 1 tablet (2.5 mg) by mouth 1 time if needed. Historical Provider, MD  Active   Nurtec ODT 75 mg tablet,disintegrating [id number]  DISSOLVE ONE TABLET IN MOUTH once DAILY as needed for migraines. Brittany Behm, DO  Active   omeprazole (PriLOSEC) 40 mg DR capsule [id number]  Take 1 capsule (40 mg) by mouth once daily in the morning. Take before meals. Do not crush or chew. Carmine Henderson MD  Active   ondansetron (Zofran) 4 mg tablet [id number]  Take 1 tablet (4 mg) by mouth every 6 hours if needed for nausea. Brittany Behm, DO  Active   oxyCODONE-acetaminophen (Percocet) 5-325 mg tablet [id number]  Take 1 tablet by mouth every 6 hours if needed. Historical Provider, MD  Active   rizatriptan (Maxalt) 10 mg tablet [id number]  Take 1 tablet (10 mg) by mouth 1 time if needed for migraine for up to 1 dose. TAKE 1 TABLET BY MOUTH AS NEEDED FOR MIGRAINE HEADACHE (SEE ADMINISTRATION INSTRUCTIONS). MAY REPEAT IN 2 HOURS IF NEEDED. Brittany Behm, DO  Active   traMADol (Ultram) 50 mg tablet [id number]  Take 1 tablet (50 mg) by mouth 3 times a day. Historical Provider, MD  Active   triamterene-hydrochlorothiazid (Dyazide) 37.5-25 mg capsule [id number]  Take 1 capsule by mouth once daily. Brittany Behm, DO  Active                     Vitals:    07/21/25 0803   BP: 104/76   Pulse: 77   Resp: 20   Temp: 36.3 °C (97.3 °F)   TempSrc: Temporal   SpO2: 96%   Weight: 86.6 kg (191 lb)   Height: (!) 1.549 m (5' 1\")      Body mass index is 36.09 kg/m².     Review of Systems   Constitutional: Negative.    HENT: Negative.     Eyes: Negative.    Respiratory: Negative.     Cardiovascular: Negative.  "   Gastrointestinal: Negative.    Endocrine: Negative.    Genitourinary: Negative.    Musculoskeletal:  Positive for arthralgias, gait problem and joint swelling. Negative for back pain, myalgias, neck pain and neck stiffness.   Skin: Negative.    Allergic/Immunologic: Negative.    Hematological: Negative.        Objective   Physical Exam  Vitals and nursing note reviewed.   Constitutional:       Appearance: Normal appearance.   HENT:      Head: Normocephalic and atraumatic.      Right Ear: Tympanic membrane, ear canal and external ear normal.      Left Ear: Tympanic membrane, ear canal and external ear normal.      Nose: Nose normal.      Mouth/Throat:      Mouth: Mucous membranes are moist.      Pharynx: Oropharynx is clear.     Eyes:      Extraocular Movements: Extraocular movements intact.      Conjunctiva/sclera: Conjunctivae normal.      Pupils: Pupils are equal, round, and reactive to light.       Cardiovascular:      Rate and Rhythm: Normal rate and regular rhythm.      Pulses: Normal pulses.      Heart sounds: Normal heart sounds.   Pulmonary:      Effort: Pulmonary effort is normal. No respiratory distress.      Breath sounds: Normal breath sounds.   Abdominal:      General: There is no distension.      Palpations: Abdomen is soft. There is no mass.      Tenderness: There is no abdominal tenderness. There is no right CVA tenderness, left CVA tenderness, guarding or rebound.      Hernia: No hernia is present.     Musculoskeletal:         General: Swelling and tenderness present.      Cervical back: Normal range of motion and neck supple. No tenderness.      Right lower leg: No edema.      Left lower leg: No edema.      Comments: Right knee pain with swelling and pain with range of motion.   Lymphadenopathy:      Cervical: No cervical adenopathy.     Skin:     General: Skin is warm and dry.      Capillary Refill: Capillary refill takes less than 2 seconds.     Neurological:      Mental Status: She is alert  and oriented to person, place, and time.      Cranial Nerves: No cranial nerve deficit.      Sensory: No sensory deficit.      Motor: Weakness present.      Coordination: Coordination normal.      Gait: Gait abnormal.      Deep Tendon Reflexes: Reflexes normal.     Psychiatric:         Behavior: Behavior normal.             Assessment/Plan   Assessment & Plan  Preop exam for internal medicine  Richard is of moderate risk for noncardiac surgery of right medial meniscectomy for right meniscus tear.    METS 9 according to DASI score    Reviewed systems as mentioned above in HPI and completed paperwork for preadmission testing from [facility].       Hypertension, unspecified type    Orders:    ECG 12 Lead normal sinus rhythm at 74 bpm.  Nonspecific T wave abnormality.    CBC; Future    Basic Metabolic Panel; Future             RAYRAY Potter-CNP 07/21/25 8:16 AM

## 2025-07-22 LAB
ALBUMIN SERPL-MCNC: 4.5 G/DL (ref 3.6–5.1)
ALP SERPL-CCNC: 78 U/L (ref 31–125)
ALT SERPL-CCNC: 18 U/L (ref 6–29)
ANION GAP SERPL CALCULATED.4IONS-SCNC: 10 MMOL/L (CALC) (ref 7–17)
AST SERPL-CCNC: 19 U/L (ref 10–35)
BILIRUB SERPL-MCNC: 0.6 MG/DL (ref 0.2–1.2)
BUN SERPL-MCNC: 15 MG/DL (ref 7–25)
CALCIUM SERPL-MCNC: 10.3 MG/DL (ref 8.6–10.2)
CHLORIDE SERPL-SCNC: 100 MMOL/L (ref 98–110)
CHOLEST SERPL-MCNC: 210 MG/DL
CHOLEST/HDLC SERPL: 3.6 (CALC)
CO2 SERPL-SCNC: 27 MMOL/L (ref 20–32)
CREAT SERPL-MCNC: 0.89 MG/DL (ref 0.5–0.99)
EGFRCR SERPLBLD CKD-EPI 2021: 79 ML/MIN/1.73M2
EST. AVERAGE GLUCOSE BLD GHB EST-MCNC: 143 MG/DL
EST. AVERAGE GLUCOSE BLD GHB EST-SCNC: 7.9 MMOL/L
GLUCOSE SERPL-MCNC: 108 MG/DL (ref 65–99)
HBA1C MFR BLD: 6.6 %
HDLC SERPL-MCNC: 58 MG/DL
LDLC SERPL CALC-MCNC: 113 MG/DL (CALC)
NONHDLC SERPL-MCNC: 152 MG/DL (CALC)
POTASSIUM SERPL-SCNC: 4.6 MMOL/L (ref 3.5–5.3)
PROT SERPL-MCNC: 7.3 G/DL (ref 6.1–8.1)
SODIUM SERPL-SCNC: 137 MMOL/L (ref 135–146)
TRIGL SERPL-MCNC: 270 MG/DL

## 2025-07-30 ENCOUNTER — TELEPHONE (OUTPATIENT)
Dept: PRIMARY CARE | Facility: CLINIC | Age: 50
End: 2025-07-30
Payer: COMMERCIAL

## 2025-07-30 DIAGNOSIS — E11.9 TYPE 2 DIABETES MELLITUS WITHOUT COMPLICATION, WITHOUT LONG-TERM CURRENT USE OF INSULIN: Primary | ICD-10-CM

## 2025-07-30 RX ORDER — TIRZEPATIDE 10 MG/.5ML
10 INJECTION, SOLUTION SUBCUTANEOUS WEEKLY
Qty: 2 ML | Refills: 11 | Status: SHIPPED | OUTPATIENT
Start: 2025-07-30

## 2025-08-06 ENCOUNTER — TELEPHONE (OUTPATIENT)
Dept: PRIMARY CARE | Facility: CLINIC | Age: 50
End: 2025-08-06
Payer: COMMERCIAL

## 2025-08-14 ENCOUNTER — PATIENT MESSAGE (OUTPATIENT)
Dept: PRIMARY CARE | Facility: CLINIC | Age: 50
End: 2025-08-14
Payer: COMMERCIAL

## 2025-08-15 ENCOUNTER — TELEPHONE (OUTPATIENT)
Dept: PRIMARY CARE | Facility: CLINIC | Age: 50
End: 2025-08-15
Payer: COMMERCIAL

## 2025-08-25 ENCOUNTER — APPOINTMENT (OUTPATIENT)
Dept: PRIMARY CARE | Facility: CLINIC | Age: 50
End: 2025-08-25
Payer: COMMERCIAL

## 2025-10-01 ENCOUNTER — APPOINTMENT (OUTPATIENT)
Dept: PRIMARY CARE | Facility: CLINIC | Age: 50
End: 2025-10-01
Payer: COMMERCIAL

## 2025-12-08 ENCOUNTER — APPOINTMENT (OUTPATIENT)
Dept: PRIMARY CARE | Facility: CLINIC | Age: 50
End: 2025-12-08
Payer: COMMERCIAL

## (undated) DEVICE — Device

## (undated) DEVICE — DRESSING, NON-ADHERENT, 3 X 3 IN, STERILE

## (undated) DEVICE — DRAPE PACK, ORTHOPEDIC, ARTHROSCOPY, CUSTOM, GEAUGA

## (undated) DEVICE — NEEDLE, SAFETY, 22 G X 1.5 IN

## (undated) DEVICE — PREP TRAY, SKIN, DRY, W/GLOVES

## (undated) DEVICE — CUFF, TOURNIQUET, 30 X 4, SNGL PORT/SNGL BLADDER, DISP, LF

## (undated) DEVICE — TUBING, ARTHROSCOPIC INFLOW, 10K

## (undated) DEVICE — GOWN, SURGICAL, IMPLT, BACK, DISPOSABLE, XLARGE, STERILE

## (undated) DEVICE — KIT, MAGELLAN, COMPLETE ONE SOURCE